# Patient Record
Sex: MALE | Race: WHITE | NOT HISPANIC OR LATINO
[De-identification: names, ages, dates, MRNs, and addresses within clinical notes are randomized per-mention and may not be internally consistent; named-entity substitution may affect disease eponyms.]

---

## 2020-07-10 ENCOUNTER — APPOINTMENT (OUTPATIENT)
Dept: UROLOGY | Facility: CLINIC | Age: 45
End: 2020-07-10
Payer: MEDICAID

## 2020-07-10 VITALS
BODY MASS INDEX: 33.6 KG/M2 | SYSTOLIC BLOOD PRESSURE: 134 MMHG | TEMPERATURE: 97.5 F | HEIGHT: 71 IN | HEART RATE: 75 BPM | WEIGHT: 240 LBS | DIASTOLIC BLOOD PRESSURE: 79 MMHG

## 2020-07-10 DIAGNOSIS — Z80.1 FAMILY HISTORY OF MALIGNANT NEOPLASM OF TRACHEA, BRONCHUS AND LUNG: ICD-10-CM

## 2020-07-10 DIAGNOSIS — Z82.49 FAMILY HISTORY OF ISCHEMIC HEART DISEASE AND OTHER DISEASES OF THE CIRCULATORY SYSTEM: ICD-10-CM

## 2020-07-10 DIAGNOSIS — Z86.39 PERSONAL HISTORY OF OTHER ENDOCRINE, NUTRITIONAL AND METABOLIC DISEASE: ICD-10-CM

## 2020-07-10 DIAGNOSIS — Z72.0 TOBACCO USE: ICD-10-CM

## 2020-07-10 PROCEDURE — 99204 OFFICE O/P NEW MOD 45 MIN: CPT

## 2020-07-10 RX ORDER — NALOXONE HYDROCHLORIDE 0.4 MG/ML
0.4 INJECTION, SOLUTION INTRAMUSCULAR; INTRAVENOUS; SUBCUTANEOUS
Refills: 0 | Status: ACTIVE | COMMUNITY

## 2020-07-10 RX ORDER — CITALOPRAM 10 MG/1
TABLET, FILM COATED ORAL
Refills: 0 | Status: ACTIVE | COMMUNITY

## 2020-07-10 NOTE — REVIEW OF SYSTEMS
[Poor quality erections] : Poor quality erections [No erections] : no erections [Negative] : Heme/Lymph [see HPI] : see HPI

## 2020-07-10 NOTE — HISTORY OF PRESENT ILLNESS
[FreeTextEntry1] : Mr. NGA MILLER 45 year old  M  PMH DM, LE neuropathy  and no PSH. Pt follows with endocrinology for DM. Pt comes in for low testosterone and decreased erections. Last testosterone 6/12/19 55. LH 1.6. PSA 0.2. A1C 8.9. Rates erections 0/10. Small erections in the morning. 1 Son. Possible interest in having more children. Pt has not tried in medicines for erections. Has decreased libido. \par

## 2020-07-10 NOTE — PHYSICAL EXAM
[General Appearance - Well Nourished] : well nourished [General Appearance - Well Developed] : well developed [Well Groomed] : well groomed [Normal Appearance] : normal appearance [General Appearance - In No Acute Distress] : no acute distress [Edema] : no peripheral edema [Respiration, Rhythm And Depth] : normal respiratory rhythm and effort [Abdomen Soft] : soft [Exaggerated Use Of Accessory Muscles For Inspiration] : no accessory muscle use [Costovertebral Angle Tenderness] : no ~M costovertebral angle tenderness [Abdomen Tenderness] : non-tender [Urinary Bladder Findings] : the bladder was normal on palpation [] : no rash [Normal Station and Gait] : the gait and station were normal for the patient's age [No Focal Deficits] : no focal deficits [Oriented To Time, Place, And Person] : oriented to person, place, and time [Affect] : the affect was normal [Mood] : the mood was normal [Not Anxious] : not anxious

## 2020-08-25 ENCOUNTER — APPOINTMENT (OUTPATIENT)
Dept: ENDOCRINOLOGY | Facility: CLINIC | Age: 45
End: 2020-08-25
Payer: MEDICAID

## 2020-08-25 VITALS
DIASTOLIC BLOOD PRESSURE: 90 MMHG | BODY MASS INDEX: 33.6 KG/M2 | SYSTOLIC BLOOD PRESSURE: 130 MMHG | HEIGHT: 71 IN | WEIGHT: 240 LBS

## 2020-08-25 PROCEDURE — 99204 OFFICE O/P NEW MOD 45 MIN: CPT

## 2020-08-25 RX ORDER — LANCETS
EACH MISCELLANEOUS
Qty: 4 | Refills: 0 | Status: ACTIVE | COMMUNITY
Start: 2020-08-25 | End: 1900-01-01

## 2020-08-25 RX ORDER — BLOOD SUGAR DIAGNOSTIC
STRIP MISCELLANEOUS
Qty: 4 | Refills: 0 | Status: ACTIVE | COMMUNITY
Start: 2020-08-25 | End: 1900-01-01

## 2020-08-25 RX ORDER — BLOOD-GLUCOSE METER
W/DEVICE KIT MISCELLANEOUS
Qty: 1 | Refills: 0 | Status: ACTIVE | COMMUNITY
Start: 2020-08-25 | End: 1900-01-01

## 2020-08-25 NOTE — PHYSICAL EXAM
[Alert] : alert [No Acute Distress] : no acute distress [Well Nourished] : well nourished [Normal Hearing] : hearing was normal [Normal Sclera/Conjunctiva] : normal sclera/conjunctiva [Thyroid Not Enlarged] : the thyroid was not enlarged [No Accessory Muscle Use] : no accessory muscle use [Normal S1, S2] : normal S1 and S2 [Clear to Auscultation] : lungs were clear to auscultation bilaterally [Normal Rate] : heart rate was normal [Soft] : abdomen soft [No Edema] : no peripheral edema [Not Tender] : non-tender [No Rash] : no rash [Normal Gait] : normal gait [Oriented x3] : oriented to person, place, and time [de-identified] : slight hand tremor

## 2020-08-25 NOTE — ASSESSMENT
[FreeTextEntry1] : T2DM\par -Long discussion had with patient about severity of disease and its potential complications\par -Begin Victoza 0.6 mg for 7 days and then increase to 1.2 mg daily. He has never injected before so he will need to be taught at nurse apt\par -Begin Jardiance 10 mg daily\par -Continue MFN 1000 mg BID\par -Begin checking BS 2-3x a day, meter sent to pharmacy. Send logs in every 2 weeks\par -Needs CDE apt for diet teach\par -Increase exercise as tolerated, goal of 30 mins a day 5x a day\par -Continue to follow up with all specialists including ophtho, podiatry, neurology \par \par \par HTN\par -Will continue to monitor, likely will need aceI for kidney protection \par \par \par HLD\par -Continue statin \par \par \par Low Testosterone \par -Begin Testosterone injections 100 mg every 2 weeks,will send supplies and rx to pharmacy for patient to bring to [facility] for a nursing injection apt\par \par RTO 3 weeks CDE, 6 weeks with NP

## 2020-08-25 NOTE — REVIEW OF SYSTEMS
[Fatigue] : fatigue [Recent Weight Gain (___ Lbs)] : recent weight gain: [unfilled] lbs [Dysphagia] : dysphagia [Constipation] : constipation [Polyuria] : polyuria [Cold Intolerance] : cold intolerance [Erectile Dysfunction] : erectile dysfunction [Decreased Libido] : decreased libido [Depression] : depression [Anxiety] : anxiety [Visual Field Defect] : no visual field defect [Blurred Vision] : no blurred vision [Neck Pain] : no neck pain [Dysphonia] : no dysphonia [Chest Pain] : no chest pain [Palpitations] : no palpitations [Shortness Of Breath] : no shortness of breath [Nausea] : no nausea [Vomiting] : no vomiting [Headaches] : no headaches [Nocturia] : no nocturia [Diarrhea] : no diarrhea [Tremors] : no tremors [Polydipsia] : no polydipsia [FreeTextEntry3] : wears contacts

## 2020-08-25 NOTE — HISTORY OF PRESENT ILLNESS
[FreeTextEntry1] : New patient presents from [place] as a T2DM with low tesosterone. \par \par T2DM\par Severity: poor control\par Onset: routine \par Duration: approx 4-5 years\par Modifying Factors: never has used insulin before\par \par SMBG\par Does not check BS, does not have meter\par \par HGA1C-10.5% 7/2020\par \par Current drug regimen\par MFN 1000 MG BID\par \par Eye exam: 2020- denies DR\par Foot exam: Saw podiatry- +neuropathy, on gabapentin \par Kidney disease: denies \par Heart disease: denies but FH, father with CAD\par \par Weight: gained 40 lbs in one year\par Diet:\par goes to bed at 4-5 am due to anxiety\par B- glucerna diabetic shake \par L- doesn’t eat lunch\par D- pbj \par S- does not snack, grapes \par Drink- water \par Exercise: goes for walks and does weight training 3x a week \par Smoking: a pack a day smoker \par \par HLD\par On no medication\par FH of high triglycerides\par Total cholesterol 160, Triglycerides 348, HDL 25, LDL 65\par \par HTN\par 130/90\par On no medications\par \par Low Testosterone \par Approx 9 years\par Has an 18 year old son\par Has never been on medication (prescribed or from [facility]) but was a opiod addict since 2009 and now on suboxone (likely the cause of his hypogonadism) \par Had brain MRI 2016 which was negative for pitutiary adenoma \par FH : Dad on androgel and felt very angry \par Low Testosterone 132 \par Free Testosterone 8.1

## 2020-08-28 ENCOUNTER — TRANSCRIPTION ENCOUNTER (OUTPATIENT)
Age: 45
End: 2020-08-28

## 2020-09-16 ENCOUNTER — APPOINTMENT (OUTPATIENT)
Dept: ENDOCRINOLOGY | Facility: CLINIC | Age: 45
End: 2020-09-16
Payer: MEDICAID

## 2020-09-16 LAB — HBA1C MFR BLD HPLC: 10.5

## 2020-09-16 PROCEDURE — G0108 DIAB MANAGE TRN  PER INDIV: CPT

## 2020-09-16 PROCEDURE — 96372 THER/PROPH/DIAG INJ SC/IM: CPT

## 2020-09-16 RX ORDER — TESTOSTERONE CYPIONATE 200 MG/ML
200 INJECTION, SOLUTION INTRAMUSCULAR
Refills: 0 | Status: COMPLETED | OUTPATIENT
Start: 2020-09-16

## 2020-09-16 RX ADMIN — TESTOSTERONE CYPIONATE 0 MG/ML: 200 INJECTION INTRAMUSCULAR at 00:00

## 2020-09-18 ENCOUNTER — TRANSCRIPTION ENCOUNTER (OUTPATIENT)
Age: 45
End: 2020-09-18

## 2020-09-29 ENCOUNTER — TRANSCRIPTION ENCOUNTER (OUTPATIENT)
Age: 45
End: 2020-09-29

## 2020-09-29 ENCOUNTER — APPOINTMENT (OUTPATIENT)
Dept: ENDOCRINOLOGY | Facility: CLINIC | Age: 45
End: 2020-09-29
Payer: MEDICAID

## 2020-09-29 PROCEDURE — 96372 THER/PROPH/DIAG INJ SC/IM: CPT

## 2020-09-29 RX ORDER — TESTOSTERONE CYPIONATE 200 MG/ML
200 INJECTION, SOLUTION INTRAMUSCULAR
Refills: 0 | Status: COMPLETED | OUTPATIENT
Start: 2020-09-29

## 2020-09-29 RX ADMIN — TESTOSTERONE CYPIONATE 0 MG/ML: 200 INJECTION INTRAMUSCULAR at 00:00

## 2020-10-01 ENCOUNTER — APPOINTMENT (OUTPATIENT)
Dept: ENDOCRINOLOGY | Facility: CLINIC | Age: 45
End: 2020-10-01

## 2020-10-07 ENCOUNTER — TRANSCRIPTION ENCOUNTER (OUTPATIENT)
Age: 45
End: 2020-10-07

## 2020-10-12 ENCOUNTER — TRANSCRIPTION ENCOUNTER (OUTPATIENT)
Age: 45
End: 2020-10-12

## 2020-10-14 ENCOUNTER — TRANSCRIPTION ENCOUNTER (OUTPATIENT)
Age: 45
End: 2020-10-14

## 2020-10-15 ENCOUNTER — APPOINTMENT (OUTPATIENT)
Dept: ENDOCRINOLOGY | Facility: CLINIC | Age: 45
End: 2020-10-15
Payer: MEDICAID

## 2020-10-15 VITALS
HEIGHT: 71 IN | DIASTOLIC BLOOD PRESSURE: 70 MMHG | HEART RATE: 72 BPM | WEIGHT: 240 LBS | BODY MASS INDEX: 33.6 KG/M2 | SYSTOLIC BLOOD PRESSURE: 128 MMHG

## 2020-10-15 LAB — GLUCOSE BLDC GLUCOMTR-MCNC: 263

## 2020-10-15 PROCEDURE — 82962 GLUCOSE BLOOD TEST: CPT

## 2020-10-15 PROCEDURE — 99214 OFFICE O/P EST MOD 30 MIN: CPT | Mod: 25

## 2020-10-15 PROCEDURE — 96372 THER/PROPH/DIAG INJ SC/IM: CPT

## 2020-10-15 RX ORDER — TESTOSTERONE CYPIONATE 200 MG/ML
200 INJECTION, SOLUTION INTRAMUSCULAR
Refills: 0 | Status: COMPLETED | OUTPATIENT
Start: 2020-10-15

## 2020-10-15 RX ORDER — PEN NEEDLE, DIABETIC 29 G X1/2"
32G X 4 MM NEEDLE, DISPOSABLE MISCELLANEOUS
Qty: 1 | Refills: 0 | Status: DISCONTINUED | COMMUNITY
Start: 2020-09-16 | End: 2020-10-15

## 2020-10-15 RX ADMIN — TESTOSTERONE CYPIONATE 0 MG/ML: 200 INJECTION INTRAMUSCULAR at 00:00

## 2020-10-15 NOTE — REVIEW OF SYSTEMS
[Fatigue] : fatigue [Visual Field Defect] : visual field defect [Blurred Vision] : blurred vision [Polyuria] : polyuria [Recent Weight Gain (___ Lbs)] : no recent weight gain [Recent Weight Loss (___ Lbs)] : no recent weight loss [Dysphagia] : no dysphagia [Dysphonia] : no dysphonia [Neck Pain] : no neck pain [Palpitations] : no palpitations [Shortness Of Breath] : no shortness of breath [Chest Pain] : no chest pain [Nausea] : no nausea [Constipation] : no constipation [Polydipsia] : no polydipsia [Vomiting] : no vomiting [Diarrhea] : no diarrhea

## 2020-10-15 NOTE — ASSESSMENT
[FreeTextEntry1] : T2DM\par -Long discussion had with patient about severity of disease and its potential complications, he is very ready and willing to make changes\par -Continue Jardiance 10 mg daily\par -Continue MFN 1000 mg BID\par -Begin Januvia 50 mg daily\par -Begin checking BS 2-3x a day, meter sent to pharmacy. Send logs in every 2 weeks\par -Continue CDE apt for diet teach\par -Increase exercise as tolerated, goal of 30 mins a day 5x a day\par -Continue to follow up with all specialists including ophtho, podiatry, neurology \par \par \par HTN\par -Will continue to monitor, likely will need aceI for kidney protection \par \par \par HLD\par -Restart Gemfibrozil \par \par \par Low Testosterone \par -Continue Testosterone injections 100 mg every 2 weeks, needs labs at day 7 of his injection cycle \par -Injection today\par \par \par Labs in 1 week, will call with results\par RTO 1 week CDE, 2 weeks for injection, 6 weeks with NP

## 2020-10-15 NOTE — PHYSICAL EXAM
[Alert] : alert [Well Nourished] : well nourished [Normal Hearing] : hearing was normal [Normal Sclera/Conjunctiva] : normal sclera/conjunctiva [No Acute Distress] : no acute distress [Thyroid Not Enlarged] : the thyroid was not enlarged [No Accessory Muscle Use] : no accessory muscle use [Clear to Auscultation] : lungs were clear to auscultation bilaterally [Normal S1, S2] : normal S1 and S2 [Normal Rate] : heart rate was normal [No Edema] : no peripheral edema [Soft] : abdomen soft [Not Tender] : non-tender [No Rash] : no rash [Oriented x3] : oriented to person, place, and time [Normal Gait] : normal gait [de-identified] : slight hand tremor

## 2020-10-15 NOTE — HISTORY OF PRESENT ILLNESS
[FreeTextEntry1] : Pt starting to feel better, but still not at his goal for mental/physical health\par \par T2DM\par Severity: poor control\par Onset: routine \par Duration: approx 4-5 years\par Modifying Factors: never has used insulin before\par \par SMBG\par Checks FS 3x a day\par Fasting 160-206\par Before lunch 214-278\par At bedtime 181-262\par \par Fs in office 263\par \par HGA1C-10.5% 7/2020\par \par Current drug regimen\par MFN 1000 MG BID\par Jaridance 10 mg daily -just got this approved 2 days ago\par \par Could not tolerate Victoza due to extreme N/V\par \par Eye exam: 2020- denies DR\par Foot exam: Saw podiatry- +severe neuropathy, on gabapentin \par Kidney disease: denies \par Heart disease: denies but FH, father with CAD\par \par Weight: gained 40 lbs in one year\par Diet:\par goes to bed at 4-5 am due to anxiety, stopped binging at night \par B- glucerna diabetic shake \par L- doesn’t eat lunch\par D- pbj \par S- does not snack, grapes \par Drink- water \par Exercise: goes for walks and does weight training 3x a week \par Smoking: a pack a day smoker \par \par HLD\par On no medication\par FH of high triglycerides\par Total cholesterol 160, Triglycerides 348, HDL 25, LDL 65\par \par HTN\par 130/90\par On no medications\par \par Low Testosterone \par Approx 9 years\par Has an 18 year old son\par On 100 mg of testosterone every 2 weeks- recently began\par Had brain MRI 2016 which was negative for pitutiary adenoma \par FH : Dad on androgel and felt very angry \par Low Testosterone 132 \par Free Testosterone 8.1

## 2020-10-16 ENCOUNTER — TRANSCRIPTION ENCOUNTER (OUTPATIENT)
Age: 45
End: 2020-10-16

## 2020-10-19 ENCOUNTER — TRANSCRIPTION ENCOUNTER (OUTPATIENT)
Age: 45
End: 2020-10-19

## 2020-10-20 ENCOUNTER — TRANSCRIPTION ENCOUNTER (OUTPATIENT)
Age: 45
End: 2020-10-20

## 2020-10-20 ENCOUNTER — APPOINTMENT (OUTPATIENT)
Dept: ENDOCRINOLOGY | Facility: CLINIC | Age: 45
End: 2020-10-20

## 2020-10-20 RX ORDER — LINAGLIPTIN 5 MG/1
5 TABLET, FILM COATED ORAL
Qty: 90 | Refills: 1 | Status: DISCONTINUED | COMMUNITY
Start: 2020-10-20 | End: 2020-10-20

## 2020-10-20 RX ORDER — SITAGLIPTIN 50 MG/1
50 TABLET, FILM COATED ORAL
Qty: 90 | Refills: 1 | Status: DISCONTINUED | COMMUNITY
Start: 2020-10-15 | End: 2020-10-20

## 2020-10-26 ENCOUNTER — TRANSCRIPTION ENCOUNTER (OUTPATIENT)
Age: 45
End: 2020-10-26

## 2020-10-28 ENCOUNTER — TRANSCRIPTION ENCOUNTER (OUTPATIENT)
Age: 45
End: 2020-10-28

## 2020-10-30 ENCOUNTER — APPOINTMENT (OUTPATIENT)
Dept: ENDOCRINOLOGY | Facility: CLINIC | Age: 45
End: 2020-10-30
Payer: MEDICAID

## 2020-10-30 PROCEDURE — 96372 THER/PROPH/DIAG INJ SC/IM: CPT

## 2020-10-30 PROCEDURE — 99072 ADDL SUPL MATRL&STAF TM PHE: CPT

## 2020-10-30 RX ORDER — TESTOSTERONE CYPIONATE 200 MG/ML
200 INJECTION, SOLUTION INTRAMUSCULAR
Refills: 0 | Status: COMPLETED | OUTPATIENT
Start: 2020-10-30

## 2020-10-30 RX ADMIN — Medication 0 MG/ML: at 00:00

## 2020-11-02 ENCOUNTER — TRANSCRIPTION ENCOUNTER (OUTPATIENT)
Age: 45
End: 2020-11-02

## 2020-11-03 ENCOUNTER — TRANSCRIPTION ENCOUNTER (OUTPATIENT)
Age: 45
End: 2020-11-03

## 2020-11-04 ENCOUNTER — TRANSCRIPTION ENCOUNTER (OUTPATIENT)
Age: 45
End: 2020-11-04

## 2020-11-06 ENCOUNTER — TRANSCRIPTION ENCOUNTER (OUTPATIENT)
Age: 45
End: 2020-11-06

## 2020-11-09 ENCOUNTER — TRANSCRIPTION ENCOUNTER (OUTPATIENT)
Age: 45
End: 2020-11-09

## 2020-11-13 ENCOUNTER — APPOINTMENT (OUTPATIENT)
Dept: ENDOCRINOLOGY | Facility: CLINIC | Age: 45
End: 2020-11-13
Payer: MEDICAID

## 2020-11-13 PROCEDURE — 96372 THER/PROPH/DIAG INJ SC/IM: CPT

## 2020-11-13 PROCEDURE — 99072 ADDL SUPL MATRL&STAF TM PHE: CPT

## 2020-11-13 RX ORDER — TESTOSTERONE CYPIONATE 200 MG/ML
200 INJECTION, SOLUTION INTRAMUSCULAR
Refills: 0 | Status: COMPLETED | OUTPATIENT
Start: 2020-11-13

## 2020-11-13 RX ADMIN — TESTOSTERONE CYPIONATE 0 MG/ML: 200 INJECTION INTRAMUSCULAR at 00:00

## 2020-11-16 ENCOUNTER — TRANSCRIPTION ENCOUNTER (OUTPATIENT)
Age: 45
End: 2020-11-16

## 2020-11-19 LAB
HBA1C MFR BLD HPLC: 9.7
LDLC SERPL DIRECT ASSAY-MCNC: 101
MICROALBUMIN/CREAT 24H UR-RTO: <4

## 2020-11-20 ENCOUNTER — APPOINTMENT (OUTPATIENT)
Dept: ENDOCRINOLOGY | Facility: CLINIC | Age: 45
End: 2020-11-20
Payer: MEDICAID

## 2020-11-20 VITALS
HEIGHT: 71 IN | SYSTOLIC BLOOD PRESSURE: 132 MMHG | DIASTOLIC BLOOD PRESSURE: 84 MMHG | HEART RATE: 98 BPM | BODY MASS INDEX: 32.9 KG/M2 | WEIGHT: 235 LBS

## 2020-11-20 DIAGNOSIS — Z87.440 PERSONAL HISTORY OF URINARY (TRACT) INFECTIONS: ICD-10-CM

## 2020-11-20 PROCEDURE — 99214 OFFICE O/P EST MOD 30 MIN: CPT

## 2020-11-20 RX ORDER — LIRAGLUTIDE 6 MG/ML
18 INJECTION SUBCUTANEOUS
Qty: 3 | Refills: 0 | Status: DISCONTINUED | COMMUNITY
Start: 2020-08-25 | End: 2020-11-20

## 2020-11-20 NOTE — REVIEW OF SYSTEMS
[Fatigue] : fatigue [Recent Weight Loss (___ Lbs)] : recent weight loss: [unfilled] lbs [Diarrhea] : diarrhea [Polyuria] : polyuria [Hesistancy] : hesitancy [Depression] : depression [Anxiety] : anxiety [Polydipsia] : polydipsia [Visual Field Defect] : no visual field defect [Blurred Vision] : no blurred vision [Dysphagia] : no dysphagia [Neck Pain] : no neck pain [Dysphonia] : no dysphonia [Chest Pain] : no chest pain [Palpitations] : no palpitations [Shortness Of Breath] : no shortness of breath [Nausea] : no nausea [Constipation] : no constipation [Vomiting] : no vomiting [FreeTextEntry2] : not sleeping well  [FreeTextEntry7] : soft stool [de-identified] : at night/dry mouth

## 2020-11-20 NOTE — ASSESSMENT
[FreeTextEntry1] : Pt complains of urinary hesistancy. Urinalysis drawn in office today, unlikely a UTI but will rule out and reconsider Jardiance if so. \par \par T2DM\par -Long discussion had with patient about severity of disease and its potential complications, he is very ready and willing to make changes\par -Continue Jardiance 10 mg daily\par -Continue MFN 1000 mg BID\par -Continue Alogliptin 25 mgdaily\par -Continue checking BS 2-3x a day, meter sent to pharmacy. Send logs in every 2 weeks. Improvement noted\par -Continue CDE apt for diet teach\par -Increase exercise as tolerated, goal of 30 mins a day 5x a day\par -Continue to follow up with all specialists including ophtho, podiatry, neurology \par \par \par HTN\par -Will continue to monitor, likely will need aceI for kidney protection \par \par \par HLD\par -Continue Gemfibrozil \par \par \par Low Testosterone \par -Continue Testosterone injections 100 mg every 2 weeks\par \par \par Pt very depressed and has calls out to different mental health consuelors. If he maintains on a waiting list I will call to advocate to Sam Connell  at [email or web address] [facility] [phone]\par \par \par RTO 6 weeks with NP

## 2020-11-20 NOTE — PHYSICAL EXAM

## 2020-11-20 NOTE — HISTORY OF PRESENT ILLNESS
[FreeTextEntry1] : Pt starting to feel better, but still not at his goal for mental/physical health\par \par T2DM\par Severity: poor control\par Onset: routine \par Duration: approx 4-5 years\par Modifying Factors: never has used insulin before\par \par SMBG\par Checks FS 3x a day\par Fasting 104-134\par Before lunch 170-263\par At bedtime 132-210\par \par Fs in office 223- had cereal \par \par ONI9W-0.7% 10/2020\par \par Current drug regimen\par MFN 1000 MG BID\par Jaridance 10 mg daily -just got this approved 2 days ago\par Alogliptin 25 mg daily\par \par Could not tolerate Victoza due to extreme N/V\par \par Eye exam: 2020- denies DR\par Foot exam: Saw podiatry- +severe neuropathy, on gabapentin \par Kidney disease: denies \par Heart disease: denies but FH, father with CAD\par \par Weight: 5 lb weight loss \par Diet:\par goes to bed at 4-5 am due to anxiety, stopped binging at night \par B- glucerna diabetic shake \par L- doesn’t eat lunch\par D- pbj \par S- does not snack, grapes \par Drink- water \par Exercise: goes for walks and does weight training 3x a week \par Smoking: a pack a day smoker \par \par HLD\par Gemfibrizol 600 mg daily-just restarted\par FH of high triglycerides\par Total cholesterol 160, Triglycerides 348, HDL 25, LDL 65\par \par HTN\par 132/84\par On no medications\par \par Low Testosterone \par Approx 9 years\par Has an 18 year old son\par On 100 mg of testosterone every 2 weeks- recently began\par Had brain MRI 2016 which was negative for pitutiary adenoma \par FH : Dad on androgel and felt very angry \par Low Testosterone 132 \par Free Testosterone 8.1

## 2020-11-23 ENCOUNTER — TRANSCRIPTION ENCOUNTER (OUTPATIENT)
Age: 45
End: 2020-11-23

## 2020-11-23 LAB
APPEARANCE: CLEAR
BACTERIA UR CULT: NORMAL
BACTERIA: NEGATIVE
BILIRUBIN URINE: NEGATIVE
BLOOD URINE: NEGATIVE
COLOR: NORMAL
GLUCOSE QUALITATIVE U: ABNORMAL
HYALINE CASTS: 0 /LPF
KETONES URINE: NEGATIVE
LEUKOCYTE ESTERASE URINE: NEGATIVE
MICROSCOPIC-UA: NORMAL
NITRITE URINE: NEGATIVE
PH URINE: 5.5
PROTEIN URINE: NEGATIVE
RED BLOOD CELLS URINE: 1 /HPF
SPECIFIC GRAVITY URINE: 1.03
SQUAMOUS EPITHELIAL CELLS: 0 /HPF
UROBILINOGEN URINE: NORMAL
WHITE BLOOD CELLS URINE: 0 /HPF

## 2020-11-30 ENCOUNTER — TRANSCRIPTION ENCOUNTER (OUTPATIENT)
Age: 45
End: 2020-11-30

## 2020-11-30 ENCOUNTER — APPOINTMENT (OUTPATIENT)
Dept: ENDOCRINOLOGY | Facility: CLINIC | Age: 45
End: 2020-11-30
Payer: MEDICAID

## 2020-11-30 PROCEDURE — 99072 ADDL SUPL MATRL&STAF TM PHE: CPT

## 2020-11-30 PROCEDURE — 96372 THER/PROPH/DIAG INJ SC/IM: CPT

## 2020-11-30 RX ORDER — TESTOSTERONE CYPIONATE 200 MG/ML
200 INJECTION, SOLUTION INTRAMUSCULAR
Refills: 0 | Status: COMPLETED | OUTPATIENT
Start: 2020-11-30

## 2020-11-30 RX ADMIN — TESTOSTERONE CYPIONATE 0 MG/ML: 200 INJECTION INTRAMUSCULAR at 00:00

## 2020-12-01 ENCOUNTER — TRANSCRIPTION ENCOUNTER (OUTPATIENT)
Age: 45
End: 2020-12-01

## 2020-12-08 ENCOUNTER — TRANSCRIPTION ENCOUNTER (OUTPATIENT)
Age: 45
End: 2020-12-08

## 2020-12-14 ENCOUNTER — APPOINTMENT (OUTPATIENT)
Dept: ENDOCRINOLOGY | Facility: CLINIC | Age: 45
End: 2020-12-14
Payer: MEDICAID

## 2020-12-14 PROCEDURE — 99072 ADDL SUPL MATRL&STAF TM PHE: CPT

## 2020-12-14 PROCEDURE — 96372 THER/PROPH/DIAG INJ SC/IM: CPT

## 2020-12-14 RX ORDER — TESTOSTERONE CYPIONATE 200 MG/ML
200 INJECTION, SOLUTION INTRAMUSCULAR
Refills: 0 | Status: COMPLETED | OUTPATIENT
Start: 2020-12-14

## 2020-12-14 RX ADMIN — TESTOSTERONE CYPIONATE 0 MG/ML: 200 INJECTION INTRAMUSCULAR at 00:00

## 2020-12-21 ENCOUNTER — APPOINTMENT (OUTPATIENT)
Dept: ENDOCRINOLOGY | Facility: CLINIC | Age: 45
End: 2020-12-21
Payer: MEDICAID

## 2020-12-21 PROCEDURE — 96372 THER/PROPH/DIAG INJ SC/IM: CPT

## 2020-12-21 PROCEDURE — 99072 ADDL SUPL MATRL&STAF TM PHE: CPT

## 2020-12-21 RX ORDER — TESTOSTERONE CYPIONATE 200 MG/ML
200 INJECTION, SOLUTION INTRAMUSCULAR
Refills: 0 | Status: COMPLETED | OUTPATIENT
Start: 2020-12-21

## 2020-12-21 RX ADMIN — TESTOSTERONE CYPIONATE 0 MG/ML: 200 INJECTION INTRAMUSCULAR at 00:00

## 2020-12-23 PROBLEM — Z87.440 HISTORY OF URINARY TRACT INFECTION: Status: RESOLVED | Noted: 2020-11-16 | Resolved: 2020-12-23

## 2020-12-28 ENCOUNTER — APPOINTMENT (OUTPATIENT)
Dept: ENDOCRINOLOGY | Facility: CLINIC | Age: 45
End: 2020-12-28
Payer: MEDICAID

## 2020-12-28 PROCEDURE — 99072 ADDL SUPL MATRL&STAF TM PHE: CPT

## 2020-12-28 PROCEDURE — 96372 THER/PROPH/DIAG INJ SC/IM: CPT

## 2020-12-28 RX ORDER — TESTOSTERONE CYPIONATE 200 MG/ML
200 INJECTION, SOLUTION INTRAMUSCULAR
Refills: 0 | Status: COMPLETED | OUTPATIENT
Start: 2020-12-28

## 2020-12-28 RX ADMIN — Medication 0 MG/ML: at 00:00

## 2021-01-04 ENCOUNTER — APPOINTMENT (OUTPATIENT)
Dept: ENDOCRINOLOGY | Facility: CLINIC | Age: 46
End: 2021-01-04

## 2021-01-07 ENCOUNTER — APPOINTMENT (OUTPATIENT)
Dept: ENDOCRINOLOGY | Facility: CLINIC | Age: 46
End: 2021-01-07
Payer: MEDICAID

## 2021-01-07 ENCOUNTER — RESULT CHARGE (OUTPATIENT)
Age: 46
End: 2021-01-07

## 2021-01-07 VITALS
DIASTOLIC BLOOD PRESSURE: 88 MMHG | HEART RATE: 85 BPM | WEIGHT: 232 LBS | SYSTOLIC BLOOD PRESSURE: 142 MMHG | HEIGHT: 71 IN | BODY MASS INDEX: 32.48 KG/M2

## 2021-01-07 LAB — GLUCOSE BLDC GLUCOMTR-MCNC: 171

## 2021-01-07 PROCEDURE — 99214 OFFICE O/P EST MOD 30 MIN: CPT | Mod: 25

## 2021-01-07 PROCEDURE — 82962 GLUCOSE BLOOD TEST: CPT

## 2021-01-07 PROCEDURE — 96372 THER/PROPH/DIAG INJ SC/IM: CPT

## 2021-01-07 PROCEDURE — 99072 ADDL SUPL MATRL&STAF TM PHE: CPT

## 2021-01-07 RX ORDER — TESTOSTERONE CYPIONATE 200 MG/ML
200 INJECTION, SOLUTION INTRAMUSCULAR
Refills: 0 | Status: COMPLETED | OUTPATIENT
Start: 2021-01-07

## 2021-01-07 RX ADMIN — TESTOSTERONE CYPIONATE 0 MG/ML: 200 INJECTION INTRAMUSCULAR at 00:00

## 2021-01-07 NOTE — REVIEW OF SYSTEMS
[Fatigue] : fatigue [Decreased Appetite] : decreased appetite [Recent Weight Loss (___ Lbs)] : recent weight loss: [unfilled] lbs [Visual Field Defect] : visual field defect [Blurred Vision] : blurred vision [Dysuria] : dysuria [Hesistancy] : hesitancy [Polydipsia] : polydipsia [Recent Weight Gain (___ Lbs)] : no recent weight gain [Dysphagia] : no dysphagia [Neck Pain] : no neck pain [Dysphonia] : no dysphonia [Chest Pain] : no chest pain [Shortness Of Breath] : no shortness of breath [Constipation] : no constipation [Diarrhea] : no diarrhea [Headaches] : no headaches [FreeTextEntry3] : at times [FreeTextEntry9] : +extreme neuropathy  [de-identified] : at night

## 2021-01-07 NOTE — HISTORY OF PRESENT ILLNESS
[FreeTextEntry1] : Pt starting to feel better, but still not at his goal for mental/physical health\par \par T2DM\par Severity: poor control\par Onset: routine \par Duration: approx 4-5 years\par Modifying Factors: never has used insulin before\par \par SMBG\par Checks FS 3x a day\par On average fasting 100-200\par Before lunch high 100s, low 200s\par Before dinner high 100s, low 200s\par \par Fs in office 171-two eggs \par \par Current drug regimen\par MFN 1000 MG BID\par Jaridance 25 mg daily\par Alogliptin 25 mg daily\par \par Could not tolerate Victoza due to extreme N/V\par \par Eye exam: 2020- denies DR\par Foot exam: Saw podiatry- +severe neuropathy, on gabapentin \par Kidney disease: denies \par Heart disease: denies but FH, father with CAD\par \par Weight: 8 lb weight loss \par Diet:\par goes to bed at 4-5 am due to anxiety, stopped binging at night \par B- glucerna diabetic shake \par L- doesn’t eat lunch\par D- pbj \par S- does not snack, grapes \par Drink- water \par Exercise: goes for walks and does weight training 3x a week \par Smoking: a pack a day smoker \par \par HLD\par Gemfibrizol 600 mg daily-just restarted\par FH of high triglycerides\par Need updated lipid panel\par \par HTN\par 142/88\par On no medications\par \par Low Testosterone \par Approx 9 years\par Has an 18 year old son\par On80 mg of testosterone every week\par Had brain MRI 2016 which was negative for pitutiary adenoma \par FH : Dad on androgel and felt very angry \par \par Low Vitamin D\par 50,000 IU supplement weekly\par

## 2021-01-07 NOTE — PHYSICAL EXAM

## 2021-01-07 NOTE — ASSESSMENT
[FreeTextEntry1] : T2DM\par -Improvement noted but blood sugars not at goal\par -Begin Glimepriride 2 mg daily \par -Continue Jardiance 25 mg daily\par -Continue MFN 1000 mg BID\par -Continue Alogliptin 25 mg daily\par -Continue checking BS 2-3x a day, meter sent to pharmacy. Send logs in every 2 weeks. Improvement noted\par -Continue CDE apt for diet teach\par -Increase exercise as tolerated, goal of 30 mins a day 5x a day\par -Continue to follow up with all specialists including ophtho, podiatry, neurology \par \par \par HTN\par -Will continue to monitor, likely will need aceI for kidney protection \par \par \par HLD\par -Continue Gemfibrozil \par \par \par Low Testosterone \par -Continue Testosterone injections 80 mg every 1 week\par \par \par Pt very depressed and has calls out to different mental health consuelors. If he maintains on a waiting list I will call [phone]\par \par Will advocate to network care for a neurology apt.\par \par Fasting labs needed\par RTO 3 months

## 2021-01-08 ENCOUNTER — TRANSCRIPTION ENCOUNTER (OUTPATIENT)
Age: 46
End: 2021-01-08

## 2021-01-11 ENCOUNTER — TRANSCRIPTION ENCOUNTER (OUTPATIENT)
Age: 46
End: 2021-01-11

## 2021-01-11 ENCOUNTER — APPOINTMENT (OUTPATIENT)
Dept: UROLOGY | Facility: CLINIC | Age: 46
End: 2021-01-11
Payer: MEDICAID

## 2021-01-11 VITALS
HEART RATE: 75 BPM | BODY MASS INDEX: 32.48 KG/M2 | WEIGHT: 232 LBS | SYSTOLIC BLOOD PRESSURE: 138 MMHG | HEIGHT: 71 IN | DIASTOLIC BLOOD PRESSURE: 85 MMHG | TEMPERATURE: 97.2 F

## 2021-01-11 LAB
BILIRUB UR QL STRIP: NEGATIVE
CLARITY UR: CLEAR
COLLECTION METHOD: NORMAL
GLUCOSE UR-MCNC: 1000
HCG UR QL: 0.2 EU/DL
HGB UR QL STRIP.AUTO: NORMAL
KETONES UR-MCNC: NEGATIVE
LEUKOCYTE ESTERASE UR QL STRIP: NEGATIVE
NITRITE UR QL STRIP: NEGATIVE
PH UR STRIP: 5
PROT UR STRIP-MCNC: NEGATIVE
SP GR UR STRIP: 1.02

## 2021-01-11 PROCEDURE — 99214 OFFICE O/P EST MOD 30 MIN: CPT | Mod: 25

## 2021-01-11 PROCEDURE — 99072 ADDL SUPL MATRL&STAF TM PHE: CPT

## 2021-01-11 PROCEDURE — 51798 US URINE CAPACITY MEASURE: CPT

## 2021-01-11 PROCEDURE — 81003 URINALYSIS AUTO W/O SCOPE: CPT | Mod: QW

## 2021-01-11 RX ORDER — TAMSULOSIN HYDROCHLORIDE 0.4 MG/1
0.4 CAPSULE ORAL
Qty: 30 | Refills: 1 | Status: ACTIVE | COMMUNITY
Start: 2021-01-11 | End: 1900-01-01

## 2021-01-11 NOTE — ASSESSMENT
[FreeTextEntry1] : Plan\par UA\par culture\par start flomax\par pt to discuss with Endocrinology to stop/decrease jardiance\par fu 1 month

## 2021-01-11 NOTE — HISTORY OF PRESENT ILLNESS
[FreeTextEntry1] : Pt comes in complaining of hesitancy and intermittency mainly in the beginning of urination. This all started about 6 weeks ago around the same time his endocrinologist increased his  Jardiance. \par \par PVR 5

## 2021-01-12 LAB
APPEARANCE: CLEAR
BACTERIA: NEGATIVE
BILIRUBIN URINE: NEGATIVE
BLOOD URINE: NEGATIVE
CALCIUM OXALATE CRYSTALS: ABNORMAL
COLOR: NORMAL
GLUCOSE QUALITATIVE U: ABNORMAL
HYALINE CASTS: 0 /LPF
KETONES URINE: NEGATIVE
LEUKOCYTE ESTERASE URINE: NEGATIVE
MICROSCOPIC-UA: NORMAL
NITRITE URINE: NEGATIVE
PH URINE: 5.5
PROTEIN URINE: NEGATIVE
RED BLOOD CELLS URINE: 0 /HPF
SPECIFIC GRAVITY URINE: 1.03
SQUAMOUS EPITHELIAL CELLS: 0 /HPF
UROBILINOGEN URINE: NORMAL
WHITE BLOOD CELLS URINE: 0 /HPF

## 2021-01-13 LAB — BACTERIA UR CULT: NORMAL

## 2021-01-14 ENCOUNTER — APPOINTMENT (OUTPATIENT)
Dept: ENDOCRINOLOGY | Facility: CLINIC | Age: 46
End: 2021-01-14
Payer: MEDICAID

## 2021-01-14 PROCEDURE — 96372 THER/PROPH/DIAG INJ SC/IM: CPT

## 2021-01-14 PROCEDURE — 99072 ADDL SUPL MATRL&STAF TM PHE: CPT

## 2021-01-14 RX ORDER — TESTOSTERONE CYPIONATE 200 MG/ML
200 INJECTION, SOLUTION INTRAMUSCULAR
Refills: 0 | Status: COMPLETED | OUTPATIENT
Start: 2021-01-14

## 2021-01-14 RX ADMIN — TESTOSTERONE CYPIONATE 0 MG/ML: 200 INJECTION INTRAMUSCULAR at 00:00

## 2021-01-20 ENCOUNTER — TRANSCRIPTION ENCOUNTER (OUTPATIENT)
Age: 46
End: 2021-01-20

## 2021-01-21 ENCOUNTER — TRANSCRIPTION ENCOUNTER (OUTPATIENT)
Age: 46
End: 2021-01-21

## 2021-01-21 ENCOUNTER — APPOINTMENT (OUTPATIENT)
Dept: ENDOCRINOLOGY | Facility: CLINIC | Age: 46
End: 2021-01-21
Payer: MEDICAID

## 2021-01-21 PROCEDURE — 96372 THER/PROPH/DIAG INJ SC/IM: CPT

## 2021-01-21 PROCEDURE — 99072 ADDL SUPL MATRL&STAF TM PHE: CPT

## 2021-01-21 RX ORDER — TESTOSTERONE CYPIONATE 200 MG/ML
200 INJECTION, SOLUTION INTRAMUSCULAR
Refills: 0 | Status: COMPLETED | OUTPATIENT
Start: 2021-01-21

## 2021-01-21 RX ADMIN — TESTOSTERONE CYPIONATE 0 MG/ML: 200 INJECTION INTRAMUSCULAR at 00:00

## 2021-01-25 ENCOUNTER — TRANSCRIPTION ENCOUNTER (OUTPATIENT)
Age: 46
End: 2021-01-25

## 2021-01-28 ENCOUNTER — APPOINTMENT (OUTPATIENT)
Dept: ENDOCRINOLOGY | Facility: CLINIC | Age: 46
End: 2021-01-28
Payer: MEDICAID

## 2021-01-28 PROCEDURE — 96372 THER/PROPH/DIAG INJ SC/IM: CPT

## 2021-01-28 PROCEDURE — 99072 ADDL SUPL MATRL&STAF TM PHE: CPT

## 2021-01-28 RX ORDER — TESTOSTERONE CYPIONATE 200 MG/ML
200 INJECTION, SOLUTION INTRAMUSCULAR
Refills: 0 | Status: COMPLETED | OUTPATIENT
Start: 2021-01-28

## 2021-01-28 RX ADMIN — TESTOSTERONE CYPIONATE 0 MG/ML: 200 INJECTION INTRAMUSCULAR at 00:00

## 2021-02-08 ENCOUNTER — APPOINTMENT (OUTPATIENT)
Dept: ENDOCRINOLOGY | Facility: CLINIC | Age: 46
End: 2021-02-08
Payer: MEDICAID

## 2021-02-08 ENCOUNTER — APPOINTMENT (OUTPATIENT)
Dept: UROLOGY | Facility: CLINIC | Age: 46
End: 2021-02-08

## 2021-02-08 PROCEDURE — 99072 ADDL SUPL MATRL&STAF TM PHE: CPT

## 2021-02-08 PROCEDURE — 96372 THER/PROPH/DIAG INJ SC/IM: CPT

## 2021-02-08 RX ORDER — TESTOSTERONE CYPIONATE 200 MG/ML
200 INJECTION, SOLUTION INTRAMUSCULAR
Refills: 0 | Status: COMPLETED | OUTPATIENT
Start: 2021-02-08

## 2021-02-08 RX ADMIN — TESTOSTERONE CYPIONATE 0 MG/ML: 200 INJECTION INTRAMUSCULAR at 00:00

## 2021-02-12 ENCOUNTER — TRANSCRIPTION ENCOUNTER (OUTPATIENT)
Age: 46
End: 2021-02-12

## 2021-02-15 ENCOUNTER — TRANSCRIPTION ENCOUNTER (OUTPATIENT)
Age: 46
End: 2021-02-15

## 2021-02-15 ENCOUNTER — APPOINTMENT (OUTPATIENT)
Dept: ENDOCRINOLOGY | Facility: CLINIC | Age: 46
End: 2021-02-15
Payer: MEDICAID

## 2021-02-15 PROCEDURE — 99072 ADDL SUPL MATRL&STAF TM PHE: CPT

## 2021-02-15 PROCEDURE — 96372 THER/PROPH/DIAG INJ SC/IM: CPT

## 2021-02-15 RX ORDER — TESTOSTERONE CYPIONATE 200 MG/ML
200 INJECTION, SOLUTION INTRAMUSCULAR
Refills: 0 | Status: COMPLETED | OUTPATIENT
Start: 2021-02-15

## 2021-02-15 RX ADMIN — TESTOSTERONE CYPIONATE 0 MG/ML: 200 INJECTION INTRAMUSCULAR at 00:00

## 2021-02-16 ENCOUNTER — TRANSCRIPTION ENCOUNTER (OUTPATIENT)
Age: 46
End: 2021-02-16

## 2021-02-22 ENCOUNTER — APPOINTMENT (OUTPATIENT)
Dept: ENDOCRINOLOGY | Facility: CLINIC | Age: 46
End: 2021-02-22
Payer: MEDICAID

## 2021-02-22 PROCEDURE — 99072 ADDL SUPL MATRL&STAF TM PHE: CPT

## 2021-02-22 PROCEDURE — 96372 THER/PROPH/DIAG INJ SC/IM: CPT

## 2021-02-22 RX ORDER — TESTOSTERONE CYPIONATE 200 MG/ML
200 INJECTION, SOLUTION INTRAMUSCULAR
Refills: 0 | Status: COMPLETED | OUTPATIENT
Start: 2021-02-22

## 2021-02-22 RX ADMIN — TESTOSTERONE CYPIONATE 0 MG/ML: 200 INJECTION INTRAMUSCULAR at 00:00

## 2021-02-26 ENCOUNTER — TRANSCRIPTION ENCOUNTER (OUTPATIENT)
Age: 46
End: 2021-02-26

## 2021-02-26 RX ORDER — SILDENAFIL 50 MG/1
50 TABLET ORAL
Qty: 10 | Refills: 0 | Status: ACTIVE | COMMUNITY
Start: 2021-02-26 | End: 1900-01-01

## 2021-03-01 ENCOUNTER — APPOINTMENT (OUTPATIENT)
Dept: ENDOCRINOLOGY | Facility: CLINIC | Age: 46
End: 2021-03-01
Payer: MEDICAID

## 2021-03-01 PROCEDURE — 96372 THER/PROPH/DIAG INJ SC/IM: CPT

## 2021-03-01 PROCEDURE — 99072 ADDL SUPL MATRL&STAF TM PHE: CPT

## 2021-03-01 RX ORDER — TESTOSTERONE CYPIONATE 200 MG/ML
200 INJECTION, SOLUTION INTRAMUSCULAR
Refills: 0 | Status: COMPLETED | OUTPATIENT
Start: 2021-03-01

## 2021-03-01 RX ADMIN — TESTOSTERONE CYPIONATE 0 MG/ML: 200 INJECTION INTRAMUSCULAR at 00:00

## 2021-03-08 ENCOUNTER — APPOINTMENT (OUTPATIENT)
Dept: ENDOCRINOLOGY | Facility: CLINIC | Age: 46
End: 2021-03-08
Payer: MEDICAID

## 2021-03-08 PROCEDURE — 99072 ADDL SUPL MATRL&STAF TM PHE: CPT

## 2021-03-08 PROCEDURE — 96372 THER/PROPH/DIAG INJ SC/IM: CPT

## 2021-03-08 RX ORDER — TESTOSTERONE CYPIONATE 200 MG/ML
200 INJECTION, SOLUTION INTRAMUSCULAR
Refills: 0 | Status: COMPLETED | OUTPATIENT
Start: 2021-03-08

## 2021-03-08 RX ADMIN — TESTOSTERONE CYPIONATE 0 MG/ML: 200 INJECTION INTRAMUSCULAR at 00:00

## 2021-03-15 ENCOUNTER — APPOINTMENT (OUTPATIENT)
Dept: ENDOCRINOLOGY | Facility: CLINIC | Age: 46
End: 2021-03-15
Payer: MEDICAID

## 2021-03-15 PROCEDURE — 99072 ADDL SUPL MATRL&STAF TM PHE: CPT

## 2021-03-15 PROCEDURE — 96372 THER/PROPH/DIAG INJ SC/IM: CPT

## 2021-03-15 RX ORDER — TESTOSTERONE CYPIONATE 200 MG/ML
200 INJECTION, SOLUTION INTRAMUSCULAR
Refills: 0 | Status: COMPLETED | OUTPATIENT
Start: 2021-03-15

## 2021-03-15 RX ADMIN — TESTOSTERONE CYPIONATE 0 MG/ML: 200 INJECTION INTRAMUSCULAR at 00:00

## 2021-03-22 ENCOUNTER — APPOINTMENT (OUTPATIENT)
Dept: ENDOCRINOLOGY | Facility: CLINIC | Age: 46
End: 2021-03-22
Payer: MEDICAID

## 2021-03-22 PROCEDURE — 96372 THER/PROPH/DIAG INJ SC/IM: CPT

## 2021-03-22 PROCEDURE — 99072 ADDL SUPL MATRL&STAF TM PHE: CPT

## 2021-03-22 RX ORDER — TESTOSTERONE CYPIONATE 200 MG/ML
200 INJECTION, SOLUTION INTRAMUSCULAR
Refills: 0 | Status: COMPLETED | OUTPATIENT
Start: 2021-03-22

## 2021-03-22 RX ADMIN — TESTOSTERONE CYPIONATE 0 MG/ML: 200 INJECTION INTRAMUSCULAR at 00:00

## 2021-04-01 ENCOUNTER — APPOINTMENT (OUTPATIENT)
Dept: ENDOCRINOLOGY | Facility: CLINIC | Age: 46
End: 2021-04-01

## 2021-04-01 ENCOUNTER — APPOINTMENT (OUTPATIENT)
Dept: ENDOCRINOLOGY | Facility: CLINIC | Age: 46
End: 2021-04-01
Payer: MEDICAID

## 2021-04-01 PROCEDURE — 99072 ADDL SUPL MATRL&STAF TM PHE: CPT

## 2021-04-01 PROCEDURE — 96372 THER/PROPH/DIAG INJ SC/IM: CPT

## 2021-04-01 RX ORDER — TESTOSTERONE CYPIONATE 200 MG/ML
200 INJECTION, SOLUTION INTRAMUSCULAR
Refills: 0 | Status: COMPLETED | OUTPATIENT
Start: 2021-04-01

## 2021-04-01 RX ADMIN — TESTOSTERONE CYPIONATE 0 MG/ML: 200 INJECTION INTRAMUSCULAR at 00:00

## 2021-04-05 ENCOUNTER — APPOINTMENT (OUTPATIENT)
Dept: ENDOCRINOLOGY | Facility: CLINIC | Age: 46
End: 2021-04-05

## 2021-04-05 ENCOUNTER — TRANSCRIPTION ENCOUNTER (OUTPATIENT)
Age: 46
End: 2021-04-05

## 2021-04-07 LAB
HBA1C MFR BLD HPLC: 7.5
LDLC SERPL DIRECT ASSAY-MCNC: 105
MICROALBUMIN/CREAT 24H UR-RTO: 5

## 2021-04-08 ENCOUNTER — APPOINTMENT (OUTPATIENT)
Dept: ENDOCRINOLOGY | Facility: CLINIC | Age: 46
End: 2021-04-08
Payer: MEDICAID

## 2021-04-08 PROCEDURE — 99072 ADDL SUPL MATRL&STAF TM PHE: CPT

## 2021-04-08 PROCEDURE — 96372 THER/PROPH/DIAG INJ SC/IM: CPT

## 2021-04-08 RX ORDER — TESTOSTERONE CYPIONATE 200 MG/ML
200 INJECTION, SOLUTION INTRAMUSCULAR
Refills: 0 | Status: COMPLETED | OUTPATIENT
Start: 2021-04-08

## 2021-04-08 RX ADMIN — TESTOSTERONE CYPIONATE 0 MG/ML: 200 INJECTION INTRAMUSCULAR at 00:00

## 2021-04-08 NOTE — PHYSICAL EXAM

## 2021-04-08 NOTE — ASSESSMENT
SUBJECTIVE:   Raúl Hunt is a 2 month old male, here for a routine health maintenance visit,   accompanied by his mother, brother and .    Patient was roomed by: DRE Phelps  Do you have any forms to be completed?  no    BIRTH HISTORY   metabolic screening: All components normal    SOCIAL HISTORY  Child lives with: mother and siblings   Who takes care of your infant: family members - mom returned to work 2 weeks ago.   Language(s) spoken at home: [place]    SAFETY/HEALTH RISK  Is your child around anyone who smokes: mother states no but family smokes outside   TB exposure:  No  Is your car seat less than 6 years old, in the back seat, rear-facing, 5-point restraint:  Yes    DAILY ACTIVITIES  WATER SOURCE:  bottled water with fluoride    NUTRITION: Formula: Similac Advance   Tolerating well, no significant spitting up.    Takes 4oz per bottle, approx q2hrs.     SLEEP  Arrangements:    bassinet  Problems    none    ELIMINATION  Stools:    normal soft stools  Urination:    normal wet diapers    HEARING/VISION: no concerns, hearing and vision subjectively normal.    QUESTIONS/CONCERNS: None    ==================    DEVELOPMENT  Screening tool used, reviewed with parent/guardian:   ASQ 2 M Communication Gross Motor Fine Motor Problem Solving Personal-social   Score 50 50 50 50 60   Cutoff 22.70 41.84 30.16 24.62 33.17   Result Passed Passed Passed Passed Passed       PROBLEM LIST  Patient Active Problem List   Diagnosis     [place] exposure to maternal hepatitis B     MEDICATIONS  No current outpatient prescriptions on file.      ALLERGY  No Known Allergies    IMMUNIZATIONS  Immunization History   Administered Date(s) Administered     DTAP-IPV/HIB (PENTACEL) 2018     Hep B, Peds or Adolescent 2017, 2018     Pneumo Conj 13-V (2010&after) 2018     Rotavirus, monovalent, 2-dose 2018       HEALTH HISTORY SINCE LAST VISIT  No surgery, major illness or injury since last  [FreeTextEntry1] : T2DM\par -Improvement noted but blood sugars not at goal\par -Begin Glimepriride 2 mg daily \par -Continue Jardiance 25 mg daily\par -Continue MFN 1000 mg BID\par -Continue Alogliptin 25 mg daily\par -Continue checking BS 2-3x a day, meter sent to pharmacy. Send logs in every 2 weeks. Improvement noted\par -Continue CDE apt for diet teach\par -Increase exercise as tolerated, goal of 30 mins a day 5x a day\par -Continue to follow up with all specialists including ophtho, podiatry, neurology \par \par \par HTN\par -Will continue to monitor, likely will need aceI for kidney protection \par \par \par HLD\par -Continue Gemfibrozil \par \par \par Low Testosterone \par -Continue Testosterone injections 80 mg every 1 week\par \par \par Pt very depressed and has calls out to different mental health consuelors. If he maintains on a waiting list I will call to advocate [facility] [phone]\par \par Will advocate to network care for a neurology apt.\par \par Fasting labs needed\par RTO 3 months "physical exam    ROS  GENERAL: See health history, nutrition and daily activities   SKIN:  No  significant rash or lesions.  HEENT: Hearing/vision: see above.  No eye, nasal, ear concerns  RESP: No cough or other concerns  CV: No concerns  GI: See nutrition and elimination. No concerns.  : See elimination. No concerns  NEURO: See development    OBJECTIVE:   EXAM  Pulse 140  Temp 97.7  F (36.5  C) (Axillary)  Ht 1' 11.5\" (0.597 m)  Wt 12 lb 12 oz (5.783 kg)  HC 15.55\" (39.5 cm)  SpO2 98%  BMI 16.23 kg/m2  40 %ile based on WHO (Boys, 0-2 years) length-for-age data using vitals from 1/24/2018.  36 %ile based on WHO (Boys, 0-2 years) weight-for-age data using vitals from 1/24/2018.  35 %ile based on WHO (Boys, 0-2 years) head circumference-for-age data using vitals from 1/24/2018.  GENERAL: Active, alert, in no acute distress.  SKIN: Clear. No significant rash, abnormal pigmentation or lesions  HEAD: Normocephalic. Normal fontanels and sutures.  EYES: Conjunctivae and cornea normal. Red reflexes present bilaterally.  EARS: Normal canals. Tympanic membranes are normal; gray and translucent.  R preauricular skin tag, bilateral preauricular pit.   NOSE: Normal without discharge.  MOUTH/THROAT: Clear. No oral lesions.  NECK: Supple, no masses.  LYMPH NODES: No adenopathy  LUNGS: Clear. No rales, rhonchi, wheezing or retractions  HEART: Regular rhythm. Normal S1/S2. No murmurs. Normal femoral pulses.  ABDOMEN: Soft, non-tender, not distended, no masses or hepatosplenomegaly. Normal umbilicus and bowel sounds.   GENITALIA: Normal male external genitalia. Rommel stage I,  Testes descended bilateraly, no hernia or hydrocele.    EXTREMITIES: Hips normal with negative Ortolani and Garcia. Symmetric creases and  no deformities  NEUROLOGIC: Normal tone throughout. Normal reflexes for age    ASSESSMENT/PLAN:   1. Encounter for routine child health examination w/o abnormal findings  Normal growth, development.     - DTAP - " HIB - IPV VACCINE, IM USE (Pentacel) [48722]  - HEPATITIS B VACCINE,PED/ADOL,IM [32245]  - PNEUMOCOCCAL CONJ VACCINE 13 VALENT IM [[id number]]  - ROTAVIRUS VACC 2 DOSE ORAL  - DEVELOPMENTAL TEST, BOND    2. Preauricular skin tag  3. Congenital preauricular pit    - OTOLARYNGOLOGY REFERRAL        Anticipatory Guidance  The following topics were discussed:  SOCIAL/ FAMILY    return to work    sibling rivalry    crying/ fussiness    calming techniques    talk or sing to baby/ music  NUTRITION:    delay solid food    pumping/ introducing bottle  HEALTH/ SAFETY:    fevers    skin care    spitting up    temperature taking    sleep patterns    smoking exposure    safe crib    Preventive Care Plan  Immunizations     See orders in EpicCare.  I reviewed the signs and symptoms of adverse effects and when to seek medical care if they should arise.  Referrals/Ongoing Specialty care: Yes, see orders in EpicCare  See other orders in EpicCare    FOLLOW-UP:      4 month Preventive Care visit    MONIKA Martinez [facility]

## 2021-04-08 NOTE — HISTORY OF PRESENT ILLNESS
[FreeTextEntry1] : Pt starting to feel better, but still not at his goal for mental/physical health\par \par T2DM\par Severity: poor control\par Onset: routine \par Duration: approx 4-5 years\par Modifying Factors: never has used insulin before\par \par SMBG\par Checks FS 3x a day\par On average fasting 100-200\par Before lunch high 100s, low 200s\par Before dinner high 100s, low 200s\par \par Fs in office 171-two eggs \par \par Current drug regimen\par MFN 1000 MG BID\par Jaridance 25 mg daily\par Alogliptin 25 mg daily\par Glimepiride 2 mg daily \par \par Could not tolerate Victoza due to extreme N/V\par \par Eye exam: 2020- denies DR\par Foot exam: Saw podiatry- +severe neuropathy, on gabapentin \par Kidney disease: denies \par Heart disease: denies but FH, father with CAD\par \par Weight: 8 lb weight loss \par Diet:\par goes to bed at 4-5 am due to anxiety, stopped binging at night \par B- glucerna diabetic shake \par L- doesn’t eat lunch\par D- pbj \par S- does not snack, grapes \par Drink- water \par Exercise: goes for walks and does weight training 3x a week \par Smoking: a pack a day smoker \par \par HLD\par Gemfibrizol 600 mg daily-just restarted\par Atorvastatin 20 mg daily \par FH of high triglycerides\par Need updated lipid panel\par \par HTN\par 142/88\par On no medications\par \par Low Testosterone \par Approx 9 years\par Has an 18 year old son\par On 80 mg of testosterone every week\par Had brain MRI 2016 which was negative for pitutiary adenoma \par FH : Dad on androgel and felt very angry \par \par Low Vitamin D\par 50,000 IU supplement weekly\par

## 2021-04-15 ENCOUNTER — APPOINTMENT (OUTPATIENT)
Dept: ENDOCRINOLOGY | Facility: CLINIC | Age: 46
End: 2021-04-15
Payer: MEDICAID

## 2021-04-15 VITALS
SYSTOLIC BLOOD PRESSURE: 122 MMHG | HEART RATE: 78 BPM | DIASTOLIC BLOOD PRESSURE: 70 MMHG | WEIGHT: 230 LBS | BODY MASS INDEX: 32.2 KG/M2 | HEIGHT: 71 IN | OXYGEN SATURATION: 98 %

## 2021-04-15 LAB — GLUCOSE BLDC GLUCOMTR-MCNC: 129

## 2021-04-15 PROCEDURE — 99072 ADDL SUPL MATRL&STAF TM PHE: CPT

## 2021-04-15 PROCEDURE — 82962 GLUCOSE BLOOD TEST: CPT

## 2021-04-15 PROCEDURE — 99214 OFFICE O/P EST MOD 30 MIN: CPT | Mod: 25

## 2021-04-15 PROCEDURE — 96372 THER/PROPH/DIAG INJ SC/IM: CPT

## 2021-04-15 RX ORDER — TESTOSTERONE CYPIONATE 200 MG/ML
200 INJECTION, SOLUTION INTRAMUSCULAR
Refills: 0 | Status: COMPLETED | OUTPATIENT
Start: 2021-04-15

## 2021-04-15 RX ADMIN — TESTOSTERONE CYPIONATE 0 MG/ML: 200 INJECTION INTRAMUSCULAR at 00:00

## 2021-04-15 NOTE — REVIEW OF SYSTEMS
[Recent Weight Loss (___ Lbs)] : recent weight loss: [unfilled] lbs [Diarrhea] : diarrhea [Hesistancy] : hesitancy [Fatigue] : no fatigue [Dysphagia] : no dysphagia [Neck Pain] : no neck pain [Dysphonia] : no dysphonia [Chest Pain] : no chest pain [Shortness Of Breath] : no shortness of breath [Constipation] : no constipation [Polydipsia] : no polydipsia [FreeTextEntry2] : now sleeping through the night

## 2021-04-15 NOTE — ASSESSMENT
[FreeTextEntry1] : T2DM\par -Improvement noted, appears blood sugars may be at goal (no log or meter present today)\par -Pt appears to be in better spirits, good attitude and feels proud of his improvements\par -Continue Glimepriride 2 mg daily \par -Continue Jardiance 25 mg daily\par -Continue MFN 1000 mg BID\par -Continue Alogliptin 25 mg daily\par -Continue checking BS 2-3x a day, meter sent to pharmacy. Send logs in every 2 weeks. Improvement noted\par -Continue CDE apt for diet teach\par -Increase exercise as tolerated, goal of 30 mins a day 5x a day\par -Continue to follow up with all specialists including ophtho, podiatry, neurology \par \par \par HTN\par -Will continue to monitor, likely will need aceI for kidney protection \par \par \par HLD\par -Continue Gemfibrozil and statin- need updated lipid panel\par \par \par Low Testosterone \par -Continue Testosterone injections 80 mg every 1 week\par \par Pt now using medical marijuana and feels neuropathys are better.\par Pt biggest complaint is urinary hesistancy- feels flomax didn’t help, will follow up with urologist \par \par Fasting labs needed in May- mid week during a testosterone cycle\par RTO 3 months

## 2021-04-15 NOTE — HISTORY OF PRESENT ILLNESS
[FreeTextEntry1] : Pt starting to feel better, but still not at his goal for mental/physical health\par \par T2DM\par Severity: poor control\par Onset: routine \par Duration: approx 4-5 years\par Modifying Factors: never has used insulin before\par \par SMBG\par Checks FS 3x a day\par On average 100s\par \par Fs in office 129\par \par Current drug regimen\par MFN 1000 MG BID\par Jaridance 25 mg daily\par Alogliptin 25 mg daily\par Glimepiride 2 mg daily \par \par Could not tolerate Victoza due to extreme N/V\par \par Eye exam: 2020- denies DR\par Foot exam: Saw podiatry- +severe neuropathy, on gabapentin \par Kidney disease: denies \par Heart disease: denies but FH, father with CAD\par \par Weight: did not get in the scale today but feels lighter in his clothes \par Diet:\par B- glucerna diabetic shake \par L- doesn’t eat lunch\par D- pbj \par S- does not snack, grapes \par Drink- water \par Exercise: goes for walks and does weight training 3x a week \par Smoking: a pack a day smoker \par \par HLD\par Gemfibrizol 600 mg daily-just restarted\par Atorvastatin 20 mg daily \par FH of high triglycerides\par Need updated lipid panel\par \par HTN\par 122/70\par On no medications\par \par Low Testosterone \par Approx 9 years\par Has an 18 year old son\par On 80 mg of testosterone every week\par Had brain MRI 2016 which was negative for pitutiary adenoma \par FH : Dad on androgel and felt very angry \par \par Low Vitamin D\par 50,000 IU supplement weekly\par

## 2021-04-15 NOTE — PHYSICAL EXAM

## 2021-04-19 ENCOUNTER — RX RENEWAL (OUTPATIENT)
Age: 46
End: 2021-04-19

## 2021-04-22 ENCOUNTER — APPOINTMENT (OUTPATIENT)
Dept: ENDOCRINOLOGY | Facility: CLINIC | Age: 46
End: 2021-04-22
Payer: MEDICAID

## 2021-04-22 PROCEDURE — 99072 ADDL SUPL MATRL&STAF TM PHE: CPT

## 2021-04-22 PROCEDURE — 96372 THER/PROPH/DIAG INJ SC/IM: CPT

## 2021-04-22 RX ORDER — TESTOSTERONE CYPIONATE 200 MG/ML
200 INJECTION, SOLUTION INTRAMUSCULAR
Refills: 0 | Status: COMPLETED | OUTPATIENT
Start: 2021-04-22

## 2021-04-29 ENCOUNTER — APPOINTMENT (OUTPATIENT)
Dept: ENDOCRINOLOGY | Facility: CLINIC | Age: 46
End: 2021-04-29
Payer: MEDICAID

## 2021-04-29 PROCEDURE — 96372 THER/PROPH/DIAG INJ SC/IM: CPT

## 2021-04-29 PROCEDURE — 99072 ADDL SUPL MATRL&STAF TM PHE: CPT

## 2021-04-29 RX ORDER — TESTOSTERONE CYPIONATE 200 MG/ML
200 INJECTION, SOLUTION INTRAMUSCULAR
Refills: 0 | Status: COMPLETED | OUTPATIENT
Start: 2021-04-29

## 2021-04-29 RX ADMIN — TESTOSTERONE CYPIONATE 0 MG/ML: 200 INJECTION INTRAMUSCULAR at 00:00

## 2021-05-06 ENCOUNTER — APPOINTMENT (OUTPATIENT)
Dept: ENDOCRINOLOGY | Facility: CLINIC | Age: 46
End: 2021-05-06
Payer: MEDICAID

## 2021-05-06 PROCEDURE — 96372 THER/PROPH/DIAG INJ SC/IM: CPT

## 2021-05-06 PROCEDURE — 99072 ADDL SUPL MATRL&STAF TM PHE: CPT

## 2021-05-06 RX ORDER — TESTOSTERONE CYPIONATE 200 MG/ML
200 INJECTION, SOLUTION INTRAMUSCULAR
Refills: 0 | Status: COMPLETED | OUTPATIENT
Start: 2021-05-06

## 2021-05-06 RX ADMIN — TESTOSTERONE CYPIONATE 0 MG/ML: 200 INJECTION INTRAMUSCULAR at 00:00

## 2021-05-13 ENCOUNTER — APPOINTMENT (OUTPATIENT)
Dept: ENDOCRINOLOGY | Facility: CLINIC | Age: 46
End: 2021-05-13
Payer: MEDICAID

## 2021-05-13 PROCEDURE — 96372 THER/PROPH/DIAG INJ SC/IM: CPT

## 2021-05-13 RX ORDER — TESTOSTERONE CYPIONATE 200 MG/ML
200 INJECTION, SOLUTION INTRAMUSCULAR
Refills: 0 | Status: COMPLETED | OUTPATIENT
Start: 2021-05-13

## 2021-05-13 RX ADMIN — TESTOSTERONE CYPIONATE 0 MG/ML: 200 INJECTION INTRAMUSCULAR at 00:00

## 2021-05-20 ENCOUNTER — APPOINTMENT (OUTPATIENT)
Dept: ENDOCRINOLOGY | Facility: CLINIC | Age: 46
End: 2021-05-20
Payer: MEDICAID

## 2021-05-20 PROCEDURE — 96372 THER/PROPH/DIAG INJ SC/IM: CPT

## 2021-05-20 RX ORDER — TESTOSTERONE CYPIONATE 200 MG/ML
200 INJECTION, SOLUTION INTRAMUSCULAR
Refills: 0 | Status: COMPLETED | OUTPATIENT
Start: 2021-05-20

## 2021-05-20 RX ADMIN — TESTOSTERONE CYPIONATE 0 MG/ML: 200 INJECTION INTRAMUSCULAR at 00:00

## 2021-05-27 ENCOUNTER — APPOINTMENT (OUTPATIENT)
Dept: ENDOCRINOLOGY | Facility: CLINIC | Age: 46
End: 2021-05-27
Payer: MEDICAID

## 2021-05-27 PROCEDURE — 96372 THER/PROPH/DIAG INJ SC/IM: CPT

## 2021-05-27 RX ORDER — TESTOSTERONE CYPIONATE 200 MG/ML
200 INJECTION, SOLUTION INTRAMUSCULAR
Refills: 0 | Status: COMPLETED | OUTPATIENT
Start: 2021-05-27

## 2021-05-27 RX ADMIN — TESTOSTERONE CYPIONATE 0 MG/ML: 200 INJECTION, SOLUTION INTRAMUSCULAR at 00:00

## 2021-06-03 ENCOUNTER — APPOINTMENT (OUTPATIENT)
Dept: ENDOCRINOLOGY | Facility: CLINIC | Age: 46
End: 2021-06-03
Payer: MEDICAID

## 2021-06-03 PROCEDURE — 96372 THER/PROPH/DIAG INJ SC/IM: CPT

## 2021-06-03 RX ORDER — TESTOSTERONE CYPIONATE 200 MG/ML
200 INJECTION, SOLUTION INTRAMUSCULAR
Refills: 0 | Status: COMPLETED | OUTPATIENT
Start: 2021-06-03

## 2021-06-10 ENCOUNTER — APPOINTMENT (OUTPATIENT)
Dept: ENDOCRINOLOGY | Facility: CLINIC | Age: 46
End: 2021-06-10
Payer: MEDICAID

## 2021-06-10 PROCEDURE — 96372 THER/PROPH/DIAG INJ SC/IM: CPT

## 2021-06-10 RX ORDER — TESTOSTERONE CYPIONATE 200 MG/ML
200 INJECTION, SOLUTION INTRAMUSCULAR
Refills: 0 | Status: COMPLETED | OUTPATIENT
Start: 2021-06-10

## 2021-06-10 RX ADMIN — TESTOSTERONE CYPIONATE 0 MG/ML: 200 INJECTION, SOLUTION INTRAMUSCULAR at 00:00

## 2021-06-15 ENCOUNTER — RX RENEWAL (OUTPATIENT)
Age: 46
End: 2021-06-15

## 2021-06-17 ENCOUNTER — APPOINTMENT (OUTPATIENT)
Dept: ENDOCRINOLOGY | Facility: CLINIC | Age: 46
End: 2021-06-17
Payer: MEDICAID

## 2021-06-17 PROCEDURE — 96372 THER/PROPH/DIAG INJ SC/IM: CPT

## 2021-06-17 RX ORDER — TESTOSTERONE CYPIONATE 200 MG/ML
200 INJECTION, SOLUTION INTRAMUSCULAR
Refills: 0 | Status: COMPLETED | OUTPATIENT
Start: 2021-06-17

## 2021-06-17 RX ADMIN — TESTOSTERONE CYPIONATE 0 MG/ML: 200 INJECTION, SOLUTION INTRAMUSCULAR at 00:00

## 2021-06-24 ENCOUNTER — APPOINTMENT (OUTPATIENT)
Dept: ENDOCRINOLOGY | Facility: CLINIC | Age: 46
End: 2021-06-24
Payer: MEDICAID

## 2021-06-24 PROCEDURE — 96372 THER/PROPH/DIAG INJ SC/IM: CPT

## 2021-06-24 RX ORDER — TESTOSTERONE CYPIONATE 200 MG/ML
200 INJECTION, SOLUTION INTRAMUSCULAR
Refills: 0 | Status: COMPLETED | OUTPATIENT
Start: 2021-06-24

## 2021-06-24 RX ADMIN — TESTOSTERONE CYPIONATE 0 MG/ML: 200 INJECTION INTRAMUSCULAR at 00:00

## 2021-07-01 ENCOUNTER — APPOINTMENT (OUTPATIENT)
Dept: ENDOCRINOLOGY | Facility: CLINIC | Age: 46
End: 2021-07-01

## 2021-07-02 ENCOUNTER — APPOINTMENT (OUTPATIENT)
Dept: ENDOCRINOLOGY | Facility: CLINIC | Age: 46
End: 2021-07-02
Payer: MEDICAID

## 2021-07-02 ENCOUNTER — APPOINTMENT (OUTPATIENT)
Dept: ENDOCRINOLOGY | Facility: CLINIC | Age: 46
End: 2021-07-02

## 2021-07-02 PROCEDURE — 96372 THER/PROPH/DIAG INJ SC/IM: CPT

## 2021-07-02 RX ORDER — TESTOSTERONE CYPIONATE 200 MG/ML
200 INJECTION, SOLUTION INTRAMUSCULAR
Refills: 0 | Status: COMPLETED | OUTPATIENT
Start: 2021-07-02

## 2021-07-02 RX ADMIN — TESTOSTERONE CYPIONATE 0 MG/ML: 200 INJECTION INTRAMUSCULAR at 00:00

## 2021-07-09 ENCOUNTER — APPOINTMENT (OUTPATIENT)
Dept: ENDOCRINOLOGY | Facility: CLINIC | Age: 46
End: 2021-07-09
Payer: MEDICAID

## 2021-07-09 PROCEDURE — 96372 THER/PROPH/DIAG INJ SC/IM: CPT

## 2021-07-09 RX ORDER — TESTOSTERONE CYPIONATE 200 MG/ML
200 INJECTION, SOLUTION INTRAMUSCULAR
Refills: 0 | Status: COMPLETED | OUTPATIENT
Start: 2021-07-09

## 2021-07-09 RX ADMIN — TESTOSTERONE CYPIONATE 0 MG/ML: 200 INJECTION INTRAMUSCULAR at 00:00

## 2021-07-12 ENCOUNTER — RX RENEWAL (OUTPATIENT)
Age: 46
End: 2021-07-12

## 2021-07-15 ENCOUNTER — APPOINTMENT (OUTPATIENT)
Dept: ENDOCRINOLOGY | Facility: CLINIC | Age: 46
End: 2021-07-15
Payer: MEDICAID

## 2021-07-15 VITALS
WEIGHT: 230 LBS | SYSTOLIC BLOOD PRESSURE: 124 MMHG | DIASTOLIC BLOOD PRESSURE: 64 MMHG | BODY MASS INDEX: 32.2 KG/M2 | HEIGHT: 71 IN | OXYGEN SATURATION: 97 % | HEART RATE: 90 BPM

## 2021-07-15 LAB
GLUCOSE BLDC GLUCOMTR-MCNC: 228
HBA1C MFR BLD HPLC: 6.6
LDLC SERPL DIRECT ASSAY-MCNC: 45

## 2021-07-15 PROCEDURE — 99214 OFFICE O/P EST MOD 30 MIN: CPT | Mod: 25

## 2021-07-15 PROCEDURE — 82962 GLUCOSE BLOOD TEST: CPT

## 2021-07-15 NOTE — HISTORY OF PRESENT ILLNESS
[FreeTextEntry1] : Pt starting to feel better, but still not at his goal for mental/physical health\par Pt has court tomorrow, does not think he will go to half-way. \par \par T2DM\par Severity: poor control\par Onset: routine \par Duration: approx 4-5 years\par Modifying Factors: never has used insulin before\par \par SMBG\par Checks FS 3x a day\par On average 100s, rare 200s\par \par Fs in office 228- 2 ego waffle\par \par HGA1C 6.6\par \par Current drug regimen\par MFN 1000 MG BID\par Jaridance 25 mg daily\par Alogliptin 25 mg daily\par Glimepiride 2 mg daily \par \par Could not tolerate Victoza due to extreme N/V\par \par Eye exam: 2020- denies DR\par Foot exam: Saw podiatry- +severe neuropathy, on gabapentin \par Kidney disease: denies \par Heart disease: denies but FH, father with CAD\par \par Weight:stable\par Diet:\par B- glucerna diabetic shake \par L- doesn’t eat lunch\par D- pbj \par S- does not snack, grapes \par Drink- water \par Exercise: goes for walks and does weight training 3x a week \par Smoking: a pack a day smoker \par \par HLD\par Gemfibrizol 600 mg daily-just restarted\par Atorvastatin 20 mg daily \par FH of high triglycerides\par Triglycerides 287, HDL 26, LDL 44, Total cholesterol 115\par \par HTN\par 124/64\par On no medications\par \par Low Testosterone \par Approx 9 years\par Has an 18 year old son\par On 80 mg of testosterone every week\par Had brain MRI 2016 which was negative for pitutiary adenoma \par FH : Dad on androgel and felt very angry \par Total testosterone 816, Free Testosterone 29.2\par No longer needs nap every day\par \par Low Vitamin D\par 50,000 IU supplement weekly\par On labs 36.6

## 2021-07-15 NOTE — PHYSICAL EXAM

## 2021-07-15 NOTE — ASSESSMENT
[FreeTextEntry1] : T2DM\par -Improvement noted, pt doing well! \par -Pt appears to be in better spirits, good attitude and feels proud of his improvements\par -Continue Glimepriride 2 mg daily \par -Continue Jardiance 25 mg daily\par -Continue MFN 1000 mg BID\par -Continue Alogliptin 25 mg daily\par -Continue checking BS 2-3x a day, meter sent to pharmacy. Send logs in every 2 weeks. Improvement noted. If blood sugars begin to trend low, pt will call office for med changes\par -Increase exercise as tolerated, goal of 30 mins a day 5x a day\par -Continue to follow up with all specialists including ophtho, podiatry, neurology \par \par \par HTN\par -Will continue to monitor, likely will need aceI for kidney protection \par \par \par HLD\par -Continue Gemfibrozil and statin- triglycerides not at goal\par \par \par Low Testosterone \par -Decrease Testosterone injections .3 ml every 1 week as hormonal panel shows he is on too much Testosterone\par \par Pt now using medical marijuana and feels neuropathys are better.\par Pt biggest complaint is urinary hesistancy- feels flomax didn’t help, will follow up with urologist \par \par Fasting labs needed in 3 months, day 4/7 of testosterone cycle\par RTO 3 months

## 2021-07-15 NOTE — REVIEW OF SYSTEMS
[Recent Weight Loss (___ Lbs)] : recent weight loss: [unfilled] lbs [Erectile Dysfunction] : erectile dysfunction [Fatigue] : no fatigue [Recent Weight Gain (___ Lbs)] : no recent weight gain [Visual Field Defect] : no visual field defect [Blurred Vision] : no blurred vision [Chest Pain] : no chest pain [Shortness Of Breath] : no shortness of breath [Constipation] : no constipation [Diarrhea] : no diarrhea [Polydipsia] : no polydipsia [FreeTextEntry8] : +hesistancy

## 2021-07-16 ENCOUNTER — APPOINTMENT (OUTPATIENT)
Dept: ENDOCRINOLOGY | Facility: CLINIC | Age: 46
End: 2021-07-16
Payer: MEDICAID

## 2021-07-16 PROCEDURE — 96372 THER/PROPH/DIAG INJ SC/IM: CPT

## 2021-07-16 RX ORDER — TESTOSTERONE CYPIONATE 200 MG/ML
200 INJECTION, SOLUTION INTRAMUSCULAR
Refills: 0 | Status: COMPLETED | OUTPATIENT
Start: 2021-07-16

## 2021-07-16 RX ORDER — TESTOSTERONE CYPIONATE 200 MG/ML
200 INJECTION, SOLUTION INTRAMUSCULAR
Qty: 0.3 | Refills: 0 | Status: DISCONTINUED | COMMUNITY
Start: 2020-08-25 | End: 2021-07-16

## 2021-07-16 RX ADMIN — TESTOSTERONE CYPIONATE 0 MG/ML: 200 INJECTION INTRAMUSCULAR at 00:00

## 2021-07-23 ENCOUNTER — APPOINTMENT (OUTPATIENT)
Dept: ENDOCRINOLOGY | Facility: CLINIC | Age: 46
End: 2021-07-23
Payer: MEDICAID

## 2021-07-23 PROCEDURE — 96372 THER/PROPH/DIAG INJ SC/IM: CPT

## 2021-07-23 RX ORDER — TESTOSTERONE CYPIONATE 200 MG/ML
200 INJECTION, SOLUTION INTRAMUSCULAR
Refills: 0 | Status: COMPLETED | OUTPATIENT
Start: 2021-07-23

## 2021-07-23 RX ADMIN — TESTOSTERONE CYPIONATE 0 MG/ML: 200 INJECTION INTRAMUSCULAR at 00:00

## 2021-07-30 ENCOUNTER — APPOINTMENT (OUTPATIENT)
Dept: ENDOCRINOLOGY | Facility: CLINIC | Age: 46
End: 2021-07-30
Payer: MEDICAID

## 2021-07-30 PROCEDURE — 96372 THER/PROPH/DIAG INJ SC/IM: CPT

## 2021-07-30 RX ORDER — TESTOSTERONE CYPIONATE 200 MG/ML
200 INJECTION, SOLUTION INTRAMUSCULAR
Refills: 0 | Status: COMPLETED | OUTPATIENT
Start: 2021-07-30

## 2021-07-30 RX ADMIN — TESTOSTERONE CYPIONATE 0 MG/ML: 200 INJECTION INTRAMUSCULAR at 00:00

## 2021-08-06 ENCOUNTER — APPOINTMENT (OUTPATIENT)
Dept: ENDOCRINOLOGY | Facility: CLINIC | Age: 46
End: 2021-08-06
Payer: MEDICAID

## 2021-08-06 PROCEDURE — 96372 THER/PROPH/DIAG INJ SC/IM: CPT

## 2021-08-06 RX ORDER — TESTOSTERONE CYPIONATE 200 MG/ML
200 INJECTION, SOLUTION INTRAMUSCULAR
Refills: 0 | Status: COMPLETED | OUTPATIENT
Start: 2021-08-06

## 2021-08-06 RX ADMIN — TESTOSTERONE CYPIONATE 0 MG/ML: 200 INJECTION INTRAMUSCULAR at 00:00

## 2021-08-13 ENCOUNTER — APPOINTMENT (OUTPATIENT)
Dept: ENDOCRINOLOGY | Facility: CLINIC | Age: 46
End: 2021-08-13
Payer: MEDICAID

## 2021-08-13 PROCEDURE — 96372 THER/PROPH/DIAG INJ SC/IM: CPT

## 2021-08-13 RX ORDER — TESTOSTERONE CYPIONATE 200 MG/ML
200 INJECTION, SOLUTION INTRAMUSCULAR
Refills: 0 | Status: COMPLETED | OUTPATIENT
Start: 2021-08-13

## 2021-08-13 RX ADMIN — TESTOSTERONE CYPIONATE 0 MG/ML: 200 INJECTION INTRAMUSCULAR at 00:00

## 2021-08-20 ENCOUNTER — APPOINTMENT (OUTPATIENT)
Dept: ENDOCRINOLOGY | Facility: CLINIC | Age: 46
End: 2021-08-20
Payer: MEDICAID

## 2021-08-20 PROCEDURE — 96372 THER/PROPH/DIAG INJ SC/IM: CPT

## 2021-08-20 RX ORDER — TESTOSTERONE CYPIONATE 200 MG/ML
200 INJECTION, SOLUTION INTRAMUSCULAR
Refills: 0 | Status: COMPLETED | OUTPATIENT
Start: 2021-08-20

## 2021-08-20 RX ADMIN — TESTOSTERONE CYPIONATE 0 MG/ML: 200 INJECTION INTRAMUSCULAR at 00:00

## 2021-08-27 ENCOUNTER — APPOINTMENT (OUTPATIENT)
Dept: ENDOCRINOLOGY | Facility: CLINIC | Age: 46
End: 2021-08-27
Payer: MEDICAID

## 2021-08-27 PROCEDURE — 96372 THER/PROPH/DIAG INJ SC/IM: CPT

## 2021-08-27 RX ORDER — TESTOSTERONE CYPIONATE 200 MG/ML
200 INJECTION, SOLUTION INTRAMUSCULAR
Refills: 0 | Status: COMPLETED | OUTPATIENT
Start: 2021-08-27

## 2021-08-27 RX ADMIN — TESTOSTERONE CYPIONATE 0 MG/ML: 200 INJECTION INTRAMUSCULAR at 00:00

## 2021-09-03 ENCOUNTER — APPOINTMENT (OUTPATIENT)
Dept: ENDOCRINOLOGY | Facility: CLINIC | Age: 46
End: 2021-09-03
Payer: MEDICAID

## 2021-09-03 PROCEDURE — 96372 THER/PROPH/DIAG INJ SC/IM: CPT

## 2021-09-03 RX ORDER — TESTOSTERONE CYPIONATE 200 MG/ML
200 INJECTION, SOLUTION INTRAMUSCULAR
Refills: 0 | Status: COMPLETED | OUTPATIENT
Start: 2021-09-03

## 2021-09-03 RX ADMIN — TESTOSTERONE CYPIONATE 0 MG/ML: 200 INJECTION INTRAMUSCULAR at 00:00

## 2021-09-10 ENCOUNTER — APPOINTMENT (OUTPATIENT)
Dept: ENDOCRINOLOGY | Facility: CLINIC | Age: 46
End: 2021-09-10
Payer: MEDICAID

## 2021-09-10 PROCEDURE — 96372 THER/PROPH/DIAG INJ SC/IM: CPT

## 2021-09-10 RX ORDER — TESTOSTERONE CYPIONATE 200 MG/ML
200 INJECTION, SOLUTION INTRAMUSCULAR
Refills: 0 | Status: COMPLETED | OUTPATIENT
Start: 2021-09-10

## 2021-09-10 RX ADMIN — TESTOSTERONE CYPIONATE 0 MG/ML: 200 INJECTION INTRAMUSCULAR at 00:00

## 2021-09-17 ENCOUNTER — APPOINTMENT (OUTPATIENT)
Dept: ENDOCRINOLOGY | Facility: CLINIC | Age: 46
End: 2021-09-17
Payer: MEDICAID

## 2021-09-17 PROCEDURE — 96372 THER/PROPH/DIAG INJ SC/IM: CPT

## 2021-09-17 RX ORDER — TESTOSTERONE CYPIONATE 200 MG/ML
200 INJECTION, SOLUTION INTRAMUSCULAR
Refills: 0 | Status: COMPLETED | OUTPATIENT
Start: 2021-09-17

## 2021-09-17 RX ADMIN — TESTOSTERONE CYPIONATE 0 MG/ML: 200 INJECTION INTRAMUSCULAR at 00:00

## 2021-09-24 ENCOUNTER — APPOINTMENT (OUTPATIENT)
Dept: ENDOCRINOLOGY | Facility: CLINIC | Age: 46
End: 2021-09-24
Payer: MEDICAID

## 2021-09-24 PROCEDURE — 96372 THER/PROPH/DIAG INJ SC/IM: CPT

## 2021-09-24 RX ORDER — TESTOSTERONE CYPIONATE 200 MG/ML
200 INJECTION, SOLUTION INTRAMUSCULAR
Refills: 0 | Status: COMPLETED | OUTPATIENT
Start: 2021-09-24

## 2021-09-24 RX ADMIN — TESTOSTERONE CYPIONATE 0 MG/ML: 200 INJECTION INTRAMUSCULAR at 00:00

## 2021-10-04 ENCOUNTER — APPOINTMENT (OUTPATIENT)
Dept: ENDOCRINOLOGY | Facility: CLINIC | Age: 46
End: 2021-10-04

## 2021-10-08 ENCOUNTER — APPOINTMENT (OUTPATIENT)
Dept: ENDOCRINOLOGY | Facility: CLINIC | Age: 46
End: 2021-10-08
Payer: MEDICAID

## 2021-10-08 PROCEDURE — 96372 THER/PROPH/DIAG INJ SC/IM: CPT

## 2021-10-08 RX ORDER — TESTOSTERONE CYPIONATE 200 MG/ML
200 INJECTION, SOLUTION INTRAMUSCULAR
Refills: 0 | Status: COMPLETED | OUTPATIENT
Start: 2021-10-08

## 2021-10-08 RX ADMIN — TESTOSTERONE CYPIONATE 0 MG/ML: 200 INJECTION INTRAMUSCULAR at 00:00

## 2021-10-15 ENCOUNTER — APPOINTMENT (OUTPATIENT)
Dept: ENDOCRINOLOGY | Facility: CLINIC | Age: 46
End: 2021-10-15
Payer: MEDICAID

## 2021-10-15 PROCEDURE — 96372 THER/PROPH/DIAG INJ SC/IM: CPT

## 2021-10-15 RX ORDER — TESTOSTERONE CYPIONATE 200 MG/ML
200 INJECTION, SOLUTION INTRAMUSCULAR
Refills: 0 | Status: COMPLETED | OUTPATIENT
Start: 2021-10-15

## 2021-10-15 RX ADMIN — TESTOSTERONE CYPIONATE 0 MG/ML: 200 INJECTION INTRAMUSCULAR at 00:00

## 2021-10-21 ENCOUNTER — APPOINTMENT (OUTPATIENT)
Dept: ENDOCRINOLOGY | Facility: CLINIC | Age: 46
End: 2021-10-21

## 2021-10-22 ENCOUNTER — APPOINTMENT (OUTPATIENT)
Dept: ENDOCRINOLOGY | Facility: CLINIC | Age: 46
End: 2021-10-22
Payer: MEDICAID

## 2021-10-22 PROCEDURE — 96372 THER/PROPH/DIAG INJ SC/IM: CPT

## 2021-10-22 RX ORDER — TESTOSTERONE CYPIONATE 200 MG/ML
200 INJECTION, SOLUTION INTRAMUSCULAR
Refills: 0 | Status: COMPLETED | OUTPATIENT
Start: 2021-10-22

## 2021-10-22 RX ADMIN — TESTOSTERONE CYPIONATE 0 MG/ML: 200 INJECTION INTRAMUSCULAR at 00:00

## 2021-10-29 ENCOUNTER — APPOINTMENT (OUTPATIENT)
Dept: ENDOCRINOLOGY | Facility: CLINIC | Age: 46
End: 2021-10-29
Payer: MEDICAID

## 2021-10-29 PROCEDURE — 96372 THER/PROPH/DIAG INJ SC/IM: CPT

## 2021-10-29 RX ORDER — TESTOSTERONE CYPIONATE 200 MG/ML
200 INJECTION, SOLUTION INTRAMUSCULAR
Refills: 0 | Status: COMPLETED | OUTPATIENT
Start: 2021-10-29

## 2021-10-29 RX ADMIN — TESTOSTERONE CYPIONATE 0 MG/ML: 200 INJECTION INTRAMUSCULAR at 00:00

## 2021-11-05 ENCOUNTER — APPOINTMENT (OUTPATIENT)
Dept: ENDOCRINOLOGY | Facility: CLINIC | Age: 46
End: 2021-11-05

## 2021-11-12 ENCOUNTER — APPOINTMENT (OUTPATIENT)
Dept: ENDOCRINOLOGY | Facility: CLINIC | Age: 46
End: 2021-11-12
Payer: MEDICAID

## 2021-11-12 PROCEDURE — 96372 THER/PROPH/DIAG INJ SC/IM: CPT

## 2021-11-12 RX ORDER — TESTOSTERONE CYPIONATE 200 MG/ML
200 INJECTION, SOLUTION INTRAMUSCULAR
Refills: 0 | Status: COMPLETED | OUTPATIENT
Start: 2021-11-12

## 2021-11-12 RX ADMIN — TESTOSTERONE CYPIONATE 0 MG/ML: 200 INJECTION INTRAMUSCULAR at 00:00

## 2021-11-15 ENCOUNTER — APPOINTMENT (OUTPATIENT)
Dept: ENDOCRINOLOGY | Facility: CLINIC | Age: 46
End: 2021-11-15

## 2021-11-22 ENCOUNTER — APPOINTMENT (OUTPATIENT)
Dept: ENDOCRINOLOGY | Facility: CLINIC | Age: 46
End: 2021-11-22

## 2021-11-29 ENCOUNTER — APPOINTMENT (OUTPATIENT)
Dept: ENDOCRINOLOGY | Facility: CLINIC | Age: 46
End: 2021-11-29

## 2021-12-03 ENCOUNTER — APPOINTMENT (OUTPATIENT)
Dept: ENDOCRINOLOGY | Facility: CLINIC | Age: 46
End: 2021-12-03
Payer: MEDICAID

## 2021-12-03 PROCEDURE — 96372 THER/PROPH/DIAG INJ SC/IM: CPT

## 2021-12-03 RX ORDER — TESTOSTERONE CYPIONATE 200 MG/ML
200 INJECTION, SOLUTION INTRAMUSCULAR
Refills: 0 | Status: COMPLETED | OUTPATIENT
Start: 2021-12-03

## 2021-12-06 ENCOUNTER — APPOINTMENT (OUTPATIENT)
Dept: ENDOCRINOLOGY | Facility: CLINIC | Age: 46
End: 2021-12-06

## 2021-12-08 ENCOUNTER — APPOINTMENT (OUTPATIENT)
Dept: ENDOCRINOLOGY | Facility: CLINIC | Age: 46
End: 2021-12-08

## 2021-12-13 ENCOUNTER — APPOINTMENT (OUTPATIENT)
Dept: ENDOCRINOLOGY | Facility: CLINIC | Age: 46
End: 2021-12-13
Payer: MEDICAID

## 2021-12-13 PROCEDURE — 96372 THER/PROPH/DIAG INJ SC/IM: CPT

## 2021-12-13 RX ORDER — TESTOSTERONE CYPIONATE 200 MG/ML
200 INJECTION, SOLUTION INTRAMUSCULAR
Refills: 0 | Status: COMPLETED | OUTPATIENT
Start: 2021-12-13

## 2021-12-13 RX ADMIN — TESTOSTERONE CYPIONATE 0 MG/ML: 200 INJECTION INTRAMUSCULAR at 00:00

## 2021-12-14 ENCOUNTER — RX RENEWAL (OUTPATIENT)
Age: 46
End: 2021-12-14

## 2021-12-20 ENCOUNTER — APPOINTMENT (OUTPATIENT)
Dept: ENDOCRINOLOGY | Facility: CLINIC | Age: 46
End: 2021-12-20
Payer: MEDICAID

## 2021-12-20 PROCEDURE — 96372 THER/PROPH/DIAG INJ SC/IM: CPT

## 2021-12-20 RX ORDER — TESTOSTERONE CYPIONATE 200 MG/ML
200 INJECTION, SOLUTION INTRAMUSCULAR
Refills: 0 | Status: COMPLETED | OUTPATIENT
Start: 2021-12-20

## 2021-12-30 ENCOUNTER — APPOINTMENT (OUTPATIENT)
Dept: ENDOCRINOLOGY | Facility: CLINIC | Age: 46
End: 2021-12-30

## 2022-01-03 ENCOUNTER — APPOINTMENT (OUTPATIENT)
Dept: ENDOCRINOLOGY | Facility: CLINIC | Age: 47
End: 2022-01-03
Payer: MEDICAID

## 2022-01-03 PROCEDURE — 96372 THER/PROPH/DIAG INJ SC/IM: CPT

## 2022-01-03 RX ORDER — TESTOSTERONE CYPIONATE 200 MG/ML
200 INJECTION, SOLUTION INTRAMUSCULAR
Refills: 0 | Status: COMPLETED | OUTPATIENT
Start: 2022-01-03

## 2022-01-10 ENCOUNTER — APPOINTMENT (OUTPATIENT)
Dept: ENDOCRINOLOGY | Facility: CLINIC | Age: 47
End: 2022-01-10
Payer: MEDICAID

## 2022-01-10 PROCEDURE — 96372 THER/PROPH/DIAG INJ SC/IM: CPT

## 2022-01-10 RX ORDER — TESTOSTERONE CYPIONATE 200 MG/ML
200 INJECTION, SOLUTION INTRAMUSCULAR
Refills: 0 | Status: COMPLETED | OUTPATIENT
Start: 2022-01-10

## 2022-01-10 RX ADMIN — TESTOSTERONE CYPIONATE 0 MG/ML: 200 INJECTION INTRAMUSCULAR at 00:00

## 2022-01-11 ENCOUNTER — RX RENEWAL (OUTPATIENT)
Age: 47
End: 2022-01-11

## 2022-01-17 ENCOUNTER — TRANSCRIPTION ENCOUNTER (OUTPATIENT)
Age: 47
End: 2022-01-17

## 2022-01-17 ENCOUNTER — APPOINTMENT (OUTPATIENT)
Dept: ENDOCRINOLOGY | Facility: CLINIC | Age: 47
End: 2022-01-17
Payer: MEDICAID

## 2022-01-17 PROCEDURE — 96372 THER/PROPH/DIAG INJ SC/IM: CPT

## 2022-01-17 RX ORDER — TESTOSTERONE CYPIONATE 200 MG/ML
200 INJECTION, SOLUTION INTRAMUSCULAR
Refills: 0 | Status: COMPLETED | OUTPATIENT
Start: 2022-01-17

## 2022-01-17 RX ADMIN — TESTOSTERONE CYPIONATE 0 MG/ML: 200 INJECTION INTRAMUSCULAR at 00:00

## 2022-01-19 LAB
HBA1C MFR BLD HPLC: 8.3
LDLC SERPL DIRECT ASSAY-MCNC: 81
MICROALBUMIN/CREAT 24H UR-RTO: 6

## 2022-01-20 ENCOUNTER — APPOINTMENT (OUTPATIENT)
Dept: ENDOCRINOLOGY | Facility: CLINIC | Age: 47
End: 2022-01-20
Payer: MEDICAID

## 2022-01-20 ENCOUNTER — RESULT CHARGE (OUTPATIENT)
Age: 47
End: 2022-01-20

## 2022-01-20 VITALS
SYSTOLIC BLOOD PRESSURE: 120 MMHG | HEART RATE: 78 BPM | OXYGEN SATURATION: 98 % | WEIGHT: 229 LBS | HEIGHT: 71 IN | DIASTOLIC BLOOD PRESSURE: 80 MMHG | BODY MASS INDEX: 32.06 KG/M2

## 2022-01-20 LAB — GLUCOSE BLDC GLUCOMTR-MCNC: 226

## 2022-01-20 PROCEDURE — 82962 GLUCOSE BLOOD TEST: CPT

## 2022-01-20 PROCEDURE — 99214 OFFICE O/P EST MOD 30 MIN: CPT | Mod: 25

## 2022-01-20 NOTE — REVIEW OF SYSTEMS
[Fatigue] : fatigue [Decreased Appetite] : decreased appetite [Constipation] : constipation [Depression] : depression [Anxiety] : anxiety [Stress] : stress [Polydipsia] : polydipsia [Recent Weight Gain (___ Lbs)] : no recent weight gain [Recent Weight Loss (___ Lbs)] : no recent weight loss [Visual Field Defect] : no visual field defect [Blurred Vision] : no blurred vision [Chest Pain] : no chest pain [Shortness Of Breath] : no shortness of breath [Diarrhea] : no diarrhea [Polyuria] : no polyuria [FreeTextEntry9] : +generalized pain

## 2022-01-20 NOTE — PHYSICAL EXAM

## 2022-01-20 NOTE — HISTORY OF PRESENT ILLNESS
[FreeTextEntry1] : Pt admits to being very depressed lately which has worsened his diabetes control. Pt seeking out a therapist. \par \par T2DM\par Severity: poor control\par Onset: routine \par Duration: approx 4-5 years\par Modifying Factors: never has used insulin before\par \par SMBG\par Checks FS 3x a day\par On average 120s fasting, 200s post prandial\par \par Fs in office 226- coffee\par \par HGA1C 8.3 - worse\par \par Current drug regimen\par MFN 1000 MG BID\par Jaridance 25 mg daily\par Alogliptin 25 mg daily\par Glimepiride 2 mg daily \par \par Could not tolerate Victoza due to extreme N/V\par \par Eye exam: 2020- denies DR- apt 2/2022\par Foot exam: Saw podiatry- +severe neuropathy, on gabapentin \par Kidney disease: denies \par Heart disease: denies but FH, father with CAD\par \par Weight:stable\par Diet:- has only been eating potato bread toast lately- no appetite\par Drink- water \par Exercise: has not been exercising \par Smoking: a pack a day smoker \par \par HLD\par Gemfibrizol 600 mg daily\par Atorvastatin 20 mg daily \par FH of high triglycerides\par Triglycerides 556, HDL 23, LDL 81, Total cholesterol 194\par \par HTN\par 120/80\par On no medications\par \par Low Testosterone \par Approx 9 years\par Has an 18 year old son\par On 60 mg of testosterone every week\par Had brain MRI 2016 which was negative for pitutiary adenoma \par FH : Dad on androgel and felt very angry \par Total testosterone 664, Free Testosterone 27.1, Estradiol 54.3 \par \par Low Vitamin D\par 50,000 IU supplement weekly- ran out when this blood work resulted\par On labs 19.2

## 2022-01-20 NOTE — ASSESSMENT
[FreeTextEntry1] : T2DM\par -Control worse but patient admits to not always taking his medications/eating appropriately \par -Continue Glimepriride 2 mg daily \par -Continue Jardiance 25 mg daily\par -Continue MFN 1000 mg BID\par -Continue Alogliptin 25 mg daily\par -Continue checking BS 2-3x a day, meter sent to pharmacy. Send logs in every 2 weeks. If blood sugars begin to trend low, pt will call office for med changes\par -Increase exercise as tolerated, goal of 30 mins a day 5x a day\par -Continue to follow up with all specialists including ophtho, podiatry, neurology \par \par \par HTN\par -Will continue to monitor, likely will need aceI for kidney protection \par \par \par HLD\par -Continue Gemfibrozil and statin- triglycerides not at goal\par -Increase exercise efforts\par \par \par Low Testosterone \par -Continue Testosterone injections .3 ml every 1 week \par \par \par Fasting labs needed in2 months, day 4/7 of testosterone cycle\par RTO 2 months

## 2022-01-24 ENCOUNTER — APPOINTMENT (OUTPATIENT)
Dept: ENDOCRINOLOGY | Facility: CLINIC | Age: 47
End: 2022-01-24
Payer: MEDICAID

## 2022-01-24 PROCEDURE — 96372 THER/PROPH/DIAG INJ SC/IM: CPT

## 2022-01-24 RX ORDER — TESTOSTERONE CYPIONATE 200 MG/ML
200 INJECTION, SOLUTION INTRAMUSCULAR
Refills: 0 | Status: COMPLETED | OUTPATIENT
Start: 2022-01-24

## 2022-01-24 RX ADMIN — TESTOSTERONE CYPIONATE 0 MG/ML: 200 INJECTION INTRAMUSCULAR at 00:00

## 2022-01-31 ENCOUNTER — APPOINTMENT (OUTPATIENT)
Dept: ENDOCRINOLOGY | Facility: CLINIC | Age: 47
End: 2022-01-31
Payer: MEDICAID

## 2022-01-31 PROCEDURE — 96372 THER/PROPH/DIAG INJ SC/IM: CPT

## 2022-01-31 RX ORDER — TESTOSTERONE CYPIONATE 200 MG/ML
200 INJECTION, SOLUTION INTRAMUSCULAR
Refills: 0 | Status: COMPLETED | OUTPATIENT
Start: 2022-01-31

## 2022-01-31 RX ADMIN — TESTOSTERONE CYPIONATE 0 MG/ML: 200 INJECTION INTRAMUSCULAR at 00:00

## 2022-02-07 ENCOUNTER — RX RENEWAL (OUTPATIENT)
Age: 47
End: 2022-02-07

## 2022-02-07 ENCOUNTER — APPOINTMENT (OUTPATIENT)
Dept: ENDOCRINOLOGY | Facility: CLINIC | Age: 47
End: 2022-02-07
Payer: MEDICAID

## 2022-02-07 PROCEDURE — 96372 THER/PROPH/DIAG INJ SC/IM: CPT

## 2022-02-07 RX ORDER — TESTOSTERONE CYPIONATE 200 MG/ML
200 INJECTION, SOLUTION INTRAMUSCULAR
Refills: 0 | Status: COMPLETED | OUTPATIENT
Start: 2022-02-07

## 2022-02-07 RX ADMIN — TESTOSTERONE CYPIONATE 0 MG/ML: 200 INJECTION INTRAMUSCULAR at 00:00

## 2022-02-14 ENCOUNTER — APPOINTMENT (OUTPATIENT)
Dept: ENDOCRINOLOGY | Facility: CLINIC | Age: 47
End: 2022-02-14
Payer: MEDICAID

## 2022-02-14 PROCEDURE — 96372 THER/PROPH/DIAG INJ SC/IM: CPT

## 2022-02-14 RX ORDER — TESTOSTERONE CYPIONATE 200 MG/ML
200 INJECTION, SOLUTION INTRAMUSCULAR
Refills: 0 | Status: COMPLETED | OUTPATIENT
Start: 2022-02-14

## 2022-02-14 RX ADMIN — TESTOSTERONE CYPIONATE 0 MG/ML: 200 INJECTION INTRAMUSCULAR at 00:00

## 2022-02-21 ENCOUNTER — APPOINTMENT (OUTPATIENT)
Dept: ENDOCRINOLOGY | Facility: CLINIC | Age: 47
End: 2022-02-21
Payer: MEDICAID

## 2022-02-21 PROCEDURE — 96372 THER/PROPH/DIAG INJ SC/IM: CPT

## 2022-02-21 RX ORDER — TESTOSTERONE CYPIONATE 200 MG/ML
200 INJECTION, SOLUTION INTRAMUSCULAR
Refills: 0 | Status: COMPLETED | OUTPATIENT
Start: 2022-02-21

## 2022-02-21 RX ADMIN — TESTOSTERONE CYPIONATE 0 MG/ML: 200 INJECTION INTRAMUSCULAR at 00:00

## 2022-02-28 ENCOUNTER — APPOINTMENT (OUTPATIENT)
Dept: ENDOCRINOLOGY | Facility: CLINIC | Age: 47
End: 2022-02-28
Payer: MEDICAID

## 2022-02-28 PROCEDURE — 96372 THER/PROPH/DIAG INJ SC/IM: CPT

## 2022-02-28 RX ORDER — TESTOSTERONE CYPIONATE 200 MG/ML
200 INJECTION, SOLUTION INTRAMUSCULAR
Refills: 0 | Status: COMPLETED | OUTPATIENT
Start: 2022-02-28

## 2022-02-28 RX ADMIN — TESTOSTERONE CYPIONATE 0 MG/ML: 200 INJECTION INTRAMUSCULAR at 00:00

## 2022-03-07 ENCOUNTER — APPOINTMENT (OUTPATIENT)
Dept: ENDOCRINOLOGY | Facility: CLINIC | Age: 47
End: 2022-03-07
Payer: MEDICAID

## 2022-03-07 PROCEDURE — 96372 THER/PROPH/DIAG INJ SC/IM: CPT

## 2022-03-07 RX ORDER — TESTOSTERONE CYPIONATE 200 MG/ML
200 INJECTION, SOLUTION INTRAMUSCULAR
Refills: 0 | Status: COMPLETED | OUTPATIENT
Start: 2022-03-07

## 2022-03-07 RX ADMIN — TESTOSTERONE CYPIONATE 0 MG/ML: 200 INJECTION INTRAMUSCULAR at 00:00

## 2022-03-09 ENCOUNTER — TRANSCRIPTION ENCOUNTER (OUTPATIENT)
Age: 47
End: 2022-03-09

## 2022-03-12 ENCOUNTER — TRANSCRIPTION ENCOUNTER (OUTPATIENT)
Age: 47
End: 2022-03-12

## 2022-03-14 ENCOUNTER — APPOINTMENT (OUTPATIENT)
Dept: ENDOCRINOLOGY | Facility: CLINIC | Age: 47
End: 2022-03-14
Payer: MEDICAID

## 2022-03-14 VITALS
SYSTOLIC BLOOD PRESSURE: 132 MMHG | HEIGHT: 71 IN | WEIGHT: 226 LBS | BODY MASS INDEX: 31.64 KG/M2 | DIASTOLIC BLOOD PRESSURE: 72 MMHG | OXYGEN SATURATION: 98 % | HEART RATE: 92 BPM

## 2022-03-14 LAB
GLUCOSE BLDC GLUCOMTR-MCNC: 223
HBA1C MFR BLD HPLC: 7.4
LDLC SERPL DIRECT ASSAY-MCNC: 84
MICROALBUMIN/CREAT 24H UR-RTO: 4

## 2022-03-14 PROCEDURE — 99214 OFFICE O/P EST MOD 30 MIN: CPT | Mod: 25

## 2022-03-14 PROCEDURE — 96372 THER/PROPH/DIAG INJ SC/IM: CPT

## 2022-03-14 PROCEDURE — 82962 GLUCOSE BLOOD TEST: CPT

## 2022-03-14 RX ORDER — TESTOSTERONE CYPIONATE 200 MG/ML
200 INJECTION, SOLUTION INTRAMUSCULAR
Refills: 0 | Status: COMPLETED | OUTPATIENT
Start: 2022-03-14

## 2022-03-14 RX ADMIN — TESTOSTERONE CYPIONATE 0 MG/ML: 200 INJECTION INTRAMUSCULAR at 00:00

## 2022-03-14 NOTE — ASSESSMENT
[FreeTextEntry1] : T2DM\par -Control improved but DM not at goal \par -Increase Glimepriride to 2 mg BID\par -Continue Jardiance 25 mg daily\par -Continue MFN 1000 mg BID\par -Continue Alogliptin 25 mg daily\par -Continue checking BS 2-3x a day, meter sent to pharmacy. Send logs in every 2 weeks. If blood sugars begin to trend low, pt will call office for med changes\par -Increase exercise as tolerated, goal of 30 mins a day 5x a day\par -Continue to follow up with all specialists including ophtho, podiatry, neurology \par \par \par HTN\par -Will continue to monitor, likely will need aceI for kidney protection \par \par \par HLD\par -Continue Gemfibrozil and statin- triglycerides not at goal but improved \par -Increase exercise efforts\par \par \par Low Testosterone \par -Free testosterone and estradiol elevated, decrease testosterone injection to 50 mg weekly \par \par \par Fasting labs needed in 3 months\par RTO 3 months

## 2022-03-14 NOTE — HISTORY OF PRESENT ILLNESS
[FreeTextEntry1] : Pt still trying to find a therapist he feels he will get along with.\par \par T2DM\par Severity: poor control\par Onset: routine \par Duration: approx 4-5 years\par Modifying Factors: never has used insulin before\par \par SMBG\par Checks FS 2x a day\par On average 120s fasting, 200s post prandial\par \par Fs in office 223- yogurt and coffee \par \par HGA1C 7.4 - improved\par \par Current drug regimen\par MFN 1000 MG BID\par Jaridance 25 mg daily\par Alogliptin 25 mg daily\par Glimepiride 2 mg daily \par \par Could not tolerate Victoza due to extreme N/V\par \par Eye exam: 2020- denies DR- due for 2022\par Foot exam: Saw podiatry- +severe neuropathy, on gabapentin , [place] 4/2022 \par Kidney disease: denies \par Heart disease: denies but FH, father with CAD\par \par Weight: down 3 lbs\par Diet:-eating less bread \par Drink- water \par Exercise: has not been exercising \par Smoking: a pack a day smoker \par \par HLD\par Gemfibrizol 600 mg daily\par Atorvastatin 20 mg daily \par FH of high triglycerides\par Triglycerides 359, HDL 25, LDL 84, Total cholesterol 168\par \par HTN\par 132/72\par On no medications\par \par Low Testosterone \par Approx 9 years\par Has an 18 year old son\par On 60 mg of testosterone every week\par Had brain MRI 2016 which was negative for pitutiary adenoma \par FH : Dad on androgel and felt very angry \par Total testosterone 563, Free Testosterone 23.3, Estradiol 47.1\par \par Low Vitamin D\par 50,000 IU supplement weekly\par On labs 32.3

## 2022-03-14 NOTE — REVIEW OF SYSTEMS
[Recent Weight Loss (___ Lbs)] : recent weight loss: [unfilled] lbs [Polyuria] : polyuria [Hesistancy] : hesitancy [Fatigue] : no fatigue [Chest Pain] : no chest pain [Shortness Of Breath] : no shortness of breath [Constipation] : no constipation [Diarrhea] : no diarrhea [Polydipsia] : no polydipsia

## 2022-03-14 NOTE — PHYSICAL EXAM

## 2022-03-15 ENCOUNTER — TRANSCRIPTION ENCOUNTER (OUTPATIENT)
Age: 47
End: 2022-03-15

## 2022-03-21 ENCOUNTER — APPOINTMENT (OUTPATIENT)
Dept: ENDOCRINOLOGY | Facility: CLINIC | Age: 47
End: 2022-03-21
Payer: MEDICAID

## 2022-03-21 PROCEDURE — 96372 THER/PROPH/DIAG INJ SC/IM: CPT

## 2022-03-21 RX ORDER — TESTOSTERONE CYPIONATE 200 MG/ML
200 INJECTION, SOLUTION INTRAMUSCULAR
Refills: 0 | Status: COMPLETED | OUTPATIENT
Start: 2022-03-21

## 2022-03-21 RX ADMIN — TESTOSTERONE CYPIONATE 0 MG/ML: 200 INJECTION INTRAMUSCULAR at 00:00

## 2022-03-28 ENCOUNTER — APPOINTMENT (OUTPATIENT)
Dept: ENDOCRINOLOGY | Facility: CLINIC | Age: 47
End: 2022-03-28
Payer: MEDICAID

## 2022-03-28 PROCEDURE — 96372 THER/PROPH/DIAG INJ SC/IM: CPT

## 2022-03-28 RX ORDER — TESTOSTERONE CYPIONATE 200 MG/ML
200 INJECTION, SOLUTION INTRAMUSCULAR
Refills: 0 | Status: COMPLETED | OUTPATIENT
Start: 2022-03-28

## 2022-03-28 RX ADMIN — TESTOSTERONE CYPIONATE 0 MG/ML: 200 INJECTION INTRAMUSCULAR at 00:00

## 2022-04-04 ENCOUNTER — APPOINTMENT (OUTPATIENT)
Dept: ENDOCRINOLOGY | Facility: CLINIC | Age: 47
End: 2022-04-04
Payer: MEDICAID

## 2022-04-04 PROCEDURE — 96372 THER/PROPH/DIAG INJ SC/IM: CPT

## 2022-04-04 RX ORDER — TESTOSTERONE CYPIONATE 200 MG/ML
200 INJECTION, SOLUTION INTRAMUSCULAR
Refills: 0 | Status: COMPLETED | OUTPATIENT
Start: 2022-04-04

## 2022-04-11 ENCOUNTER — APPOINTMENT (OUTPATIENT)
Dept: ENDOCRINOLOGY | Facility: CLINIC | Age: 47
End: 2022-04-11
Payer: MEDICAID

## 2022-04-11 PROCEDURE — 96372 THER/PROPH/DIAG INJ SC/IM: CPT

## 2022-04-11 RX ORDER — TESTOSTERONE CYPIONATE 200 MG/ML
200 INJECTION, SOLUTION INTRAMUSCULAR
Refills: 0 | Status: COMPLETED | OUTPATIENT
Start: 2022-04-11

## 2022-04-11 RX ADMIN — TESTOSTERONE CYPIONATE 0 MG/ML: 200 INJECTION INTRAMUSCULAR at 00:00

## 2022-04-18 ENCOUNTER — APPOINTMENT (OUTPATIENT)
Dept: ENDOCRINOLOGY | Facility: CLINIC | Age: 47
End: 2022-04-18
Payer: MEDICAID

## 2022-04-18 PROCEDURE — 96372 THER/PROPH/DIAG INJ SC/IM: CPT

## 2022-04-18 RX ORDER — TESTOSTERONE CYPIONATE 200 MG/ML
200 INJECTION, SOLUTION INTRAMUSCULAR
Refills: 0 | Status: COMPLETED | OUTPATIENT
Start: 2022-04-18

## 2022-04-18 RX ADMIN — TESTOSTERONE CYPIONATE 0 MG/ML: 200 INJECTION INTRAMUSCULAR at 00:00

## 2022-04-25 ENCOUNTER — APPOINTMENT (OUTPATIENT)
Dept: ENDOCRINOLOGY | Facility: CLINIC | Age: 47
End: 2022-04-25
Payer: MEDICAID

## 2022-04-25 PROCEDURE — 96372 THER/PROPH/DIAG INJ SC/IM: CPT

## 2022-04-25 RX ORDER — TESTOSTERONE CYPIONATE 200 MG/ML
200 INJECTION, SOLUTION INTRAMUSCULAR
Refills: 0 | Status: COMPLETED | OUTPATIENT
Start: 2022-04-25

## 2022-04-25 RX ADMIN — TESTOSTERONE CYPIONATE 0 MG/ML: 200 INJECTION INTRAMUSCULAR at 00:00

## 2022-05-02 ENCOUNTER — APPOINTMENT (OUTPATIENT)
Dept: ENDOCRINOLOGY | Facility: CLINIC | Age: 47
End: 2022-05-02
Payer: MEDICAID

## 2022-05-02 ENCOUNTER — TRANSCRIPTION ENCOUNTER (OUTPATIENT)
Age: 47
End: 2022-05-02

## 2022-05-02 PROCEDURE — 96372 THER/PROPH/DIAG INJ SC/IM: CPT

## 2022-05-02 RX ORDER — TESTOSTERONE CYPIONATE 200 MG/ML
200 INJECTION, SOLUTION INTRAMUSCULAR
Refills: 0 | Status: COMPLETED | OUTPATIENT
Start: 2022-05-02

## 2022-05-02 RX ADMIN — TESTOSTERONE CYPIONATE 0 MG/ML: 200 INJECTION INTRAMUSCULAR at 00:00

## 2022-05-09 ENCOUNTER — APPOINTMENT (OUTPATIENT)
Dept: ENDOCRINOLOGY | Facility: CLINIC | Age: 47
End: 2022-05-09
Payer: MEDICAID

## 2022-05-09 PROCEDURE — 96372 THER/PROPH/DIAG INJ SC/IM: CPT

## 2022-05-09 RX ORDER — TESTOSTERONE CYPIONATE 200 MG/ML
200 INJECTION, SOLUTION INTRAMUSCULAR
Refills: 0 | Status: COMPLETED | OUTPATIENT
Start: 2022-05-09

## 2022-05-09 RX ADMIN — TESTOSTERONE CYPIONATE 0 MG/ML: 200 INJECTION INTRAMUSCULAR at 00:00

## 2022-05-16 ENCOUNTER — APPOINTMENT (OUTPATIENT)
Dept: ENDOCRINOLOGY | Facility: CLINIC | Age: 47
End: 2022-05-16

## 2022-05-17 ENCOUNTER — APPOINTMENT (OUTPATIENT)
Dept: ENDOCRINOLOGY | Facility: CLINIC | Age: 47
End: 2022-05-17
Payer: MEDICAID

## 2022-05-17 PROCEDURE — 96372 THER/PROPH/DIAG INJ SC/IM: CPT

## 2022-05-17 RX ORDER — TESTOSTERONE CYPIONATE 200 MG/ML
200 INJECTION, SOLUTION INTRAMUSCULAR
Refills: 0 | Status: COMPLETED | OUTPATIENT
Start: 2022-05-17

## 2022-05-17 RX ADMIN — TESTOSTERONE CYPIONATE 0 MG/ML: 200 INJECTION INTRAMUSCULAR at 00:00

## 2022-05-23 ENCOUNTER — APPOINTMENT (OUTPATIENT)
Dept: ENDOCRINOLOGY | Facility: CLINIC | Age: 47
End: 2022-05-23
Payer: MEDICAID

## 2022-05-23 PROCEDURE — 96372 THER/PROPH/DIAG INJ SC/IM: CPT

## 2022-05-23 RX ORDER — TESTOSTERONE CYPIONATE 200 MG/ML
200 INJECTION, SOLUTION INTRAMUSCULAR
Refills: 0 | Status: COMPLETED | OUTPATIENT
Start: 2022-05-23

## 2022-05-23 RX ADMIN — TESTOSTERONE CYPIONATE 0 MG/ML: 200 INJECTION INTRAMUSCULAR at 00:00

## 2022-05-31 ENCOUNTER — APPOINTMENT (OUTPATIENT)
Dept: ENDOCRINOLOGY | Facility: CLINIC | Age: 47
End: 2022-05-31
Payer: MEDICAID

## 2022-05-31 PROCEDURE — 96372 THER/PROPH/DIAG INJ SC/IM: CPT

## 2022-05-31 RX ORDER — TESTOSTERONE CYPIONATE 200 MG/ML
200 INJECTION, SOLUTION INTRAMUSCULAR
Refills: 0 | Status: COMPLETED | OUTPATIENT
Start: 2022-05-31

## 2022-05-31 RX ADMIN — TESTOSTERONE CYPIONATE 0 MG/ML: 200 INJECTION INTRAMUSCULAR at 00:00

## 2022-06-07 ENCOUNTER — TRANSCRIPTION ENCOUNTER (OUTPATIENT)
Age: 47
End: 2022-06-07

## 2022-06-10 ENCOUNTER — APPOINTMENT (OUTPATIENT)
Dept: ENDOCRINOLOGY | Facility: CLINIC | Age: 47
End: 2022-06-10

## 2022-06-14 ENCOUNTER — APPOINTMENT (OUTPATIENT)
Dept: ENDOCRINOLOGY | Facility: CLINIC | Age: 47
End: 2022-06-14
Payer: MEDICAID

## 2022-06-14 PROCEDURE — 96372 THER/PROPH/DIAG INJ SC/IM: CPT

## 2022-06-14 RX ORDER — TESTOSTERONE CYPIONATE 200 MG/ML
200 INJECTION, SOLUTION INTRAMUSCULAR
Refills: 0 | Status: COMPLETED | OUTPATIENT
Start: 2022-06-14

## 2022-06-14 RX ADMIN — TESTOSTERONE CYPIONATE 0 MG/ML: 200 INJECTION INTRAMUSCULAR at 00:00

## 2022-06-16 ENCOUNTER — APPOINTMENT (OUTPATIENT)
Dept: ENDOCRINOLOGY | Facility: CLINIC | Age: 47
End: 2022-06-16
Payer: MEDICAID

## 2022-06-16 VITALS
DIASTOLIC BLOOD PRESSURE: 70 MMHG | BODY MASS INDEX: 32.2 KG/M2 | WEIGHT: 230 LBS | HEART RATE: 81 BPM | HEIGHT: 71 IN | SYSTOLIC BLOOD PRESSURE: 110 MMHG | OXYGEN SATURATION: 98 %

## 2022-06-16 LAB — GLUCOSE BLDC GLUCOMTR-MCNC: 297

## 2022-06-16 PROCEDURE — 99214 OFFICE O/P EST MOD 30 MIN: CPT | Mod: 25

## 2022-06-16 PROCEDURE — 82962 GLUCOSE BLOOD TEST: CPT

## 2022-06-16 RX ORDER — BUPRENORPHINE AND NALOXONE 8; 2 MG/1; MG/1
8-2 FILM BUCCAL; SUBLINGUAL
Qty: 45 | Refills: 0 | Status: ACTIVE | COMMUNITY
Start: 2022-05-23

## 2022-06-16 NOTE — HISTORY OF PRESENT ILLNESS
[FreeTextEntry1] : Pt still trying to find a therapist he feels he will get along with and takes his insurance. Pt very depressed. \par Actively looking for work but has been turned down from 63 jobs.\par +Knee pain\par \par T2DM\par Severity: poor control\par Onset: routine \par Duration: approx 4-5 years\par Modifying Factors: never has used insulin before\par \par SMBG\par Checks FS 2x a day\par On average 120s fasting, low 200s post prandial\par \par Fs in office 297-ate 2 pieces of toast, pb, and coffee\par \par Need updated HGA1C\par \par Current drug regimen\par MFN 1000 MG BID\par Jaridance 25 mg daily\par Alogliptin 25 mg daily\par Glimepiride 2 mg BID\par \par Could not tolerate Victoza due to extreme N/V\par \par Eye exam: denies DR- due for 7/2022\par Foot exam: Saw podiatry- +severe neuropathy, on gabapentin , [place] 4/2022 \par Kidney disease: denies \par Heart disease: denies but FH, father with CAD\par \par Weight: stable\par Diet:-eating less bread \par Drink- water \par Exercise: has not been exercising , doing a lot of yard work\par Smoking: a pack a day smoker \par \par HLD\par Gemfibrizol 600 mg daily\par Atorvastatin 20 mg daily \par FH of high triglycerides\par Need updated lipid panel\par \par HTN\par BP in office 110/70\par On no medications\par \par Low Testosterone \par Approx 9 years\par Has an 18 year old son\par On 50 mg of testosterone every week\par Had brain MRI 2016 which was negative for pitutiary adenoma \par FH : Dad on androgel and felt very angry \par Need updated testosterone levels\par Had missed a week of injections because his testosterone wasn’t available for \par \par Low Vitamin D\par 50,000 IU supplement weekly\par Need updated levels with labs\par \par Pt has found multiple ticks on him lately

## 2022-06-16 NOTE — ASSESSMENT
[FreeTextEntry1] : T2DM\par -Need updated HGA1C\par -Continue Glimepriride to 2 mg BID\par -Continue Jardiance 25 mg daily\par -Continue MFN 1000 mg BID\par -Continue Alogliptin 25 mg daily\par -Continue checking BS 2-3x a day, meter sent to pharmacy. Send logs in every 2 weeks. If blood sugars begin to trend low, pt will call office for med changes\par -Increase exercise as tolerated, goal of 30 mins a day 5x a day\par -Continue to follow up with all specialists including ophtho, podiatry, neurology \par \par \par HTN\par -Will continue to monitor, likely will need aceI for kidney protection \par \par \par HLD\par -Continue Gemfibrozil and statin-need updated HGA1C \par -Increase exercise efforts\par \par \par Low Testosterone \par -Free testosterone and estradiol elevated, need updated levels in between cycles- day 4/7\par \par Will monitor PSA and tick jennie crews at pt request\par Pt to let me know when he finds a therapist/mental health provider\par \par Fasting labs needed ASAP\par RTO 3 months

## 2022-06-16 NOTE — REVIEW OF SYSTEMS
[Fatigue] : fatigue [Recent Weight Gain (___ Lbs)] : no recent weight gain [Recent Weight Loss (___ Lbs)] : no recent weight loss [Chest Pain] : no chest pain [Shortness Of Breath] : no shortness of breath [Constipation] : no constipation [Diarrhea] : no diarrhea [Polyuria] : no polyuria [Hesistancy] : hesitancy [Polydipsia] : no polydipsia [FreeTextEntry9] : +generalized pain, +neuropathy

## 2022-06-16 NOTE — PHYSICAL EXAM

## 2022-06-21 ENCOUNTER — APPOINTMENT (OUTPATIENT)
Dept: ENDOCRINOLOGY | Facility: CLINIC | Age: 47
End: 2022-06-21
Payer: MEDICAID

## 2022-06-21 PROCEDURE — 96372 THER/PROPH/DIAG INJ SC/IM: CPT

## 2022-06-21 RX ORDER — TESTOSTERONE CYPIONATE 200 MG/ML
200 INJECTION, SOLUTION INTRAMUSCULAR
Refills: 0 | Status: COMPLETED | OUTPATIENT
Start: 2022-06-21

## 2022-06-21 RX ADMIN — TESTOSTERONE CYPIONATE 0 MG/ML: 200 INJECTION INTRAMUSCULAR at 00:00

## 2022-06-28 ENCOUNTER — APPOINTMENT (OUTPATIENT)
Dept: ENDOCRINOLOGY | Facility: CLINIC | Age: 47
End: 2022-06-28
Payer: MEDICAID

## 2022-06-28 PROCEDURE — 96372 THER/PROPH/DIAG INJ SC/IM: CPT

## 2022-06-28 RX ORDER — TESTOSTERONE CYPIONATE 200 MG/ML
200 INJECTION, SOLUTION INTRAMUSCULAR
Refills: 0 | Status: COMPLETED | OUTPATIENT
Start: 2022-06-28

## 2022-06-28 RX ADMIN — TESTOSTERONE CYPIONATE 0 MG/ML: 200 INJECTION INTRAMUSCULAR at 00:00

## 2022-07-05 ENCOUNTER — APPOINTMENT (OUTPATIENT)
Dept: ENDOCRINOLOGY | Facility: CLINIC | Age: 47
End: 2022-07-05

## 2022-07-05 PROCEDURE — 96372 THER/PROPH/DIAG INJ SC/IM: CPT

## 2022-07-05 RX ORDER — TESTOSTERONE CYPIONATE 200 MG/ML
200 INJECTION, SOLUTION INTRAMUSCULAR
Refills: 0 | Status: COMPLETED | OUTPATIENT
Start: 2022-07-05

## 2022-07-05 RX ADMIN — TESTOSTERONE CYPIONATE 0 MG/ML: 200 INJECTION INTRAMUSCULAR at 00:00

## 2022-07-12 ENCOUNTER — APPOINTMENT (OUTPATIENT)
Dept: ENDOCRINOLOGY | Facility: CLINIC | Age: 47
End: 2022-07-12

## 2022-07-12 PROCEDURE — 96372 THER/PROPH/DIAG INJ SC/IM: CPT

## 2022-07-12 RX ORDER — TESTOSTERONE CYPIONATE 200 MG/ML
200 INJECTION, SOLUTION INTRAMUSCULAR
Refills: 0 | Status: COMPLETED | OUTPATIENT
Start: 2022-07-12

## 2022-07-12 RX ADMIN — TESTOSTERONE CYPIONATE 0 MG/ML: 200 INJECTION INTRAMUSCULAR at 00:00

## 2022-07-19 ENCOUNTER — APPOINTMENT (OUTPATIENT)
Dept: ENDOCRINOLOGY | Facility: CLINIC | Age: 47
End: 2022-07-19

## 2022-07-19 PROCEDURE — 96372 THER/PROPH/DIAG INJ SC/IM: CPT

## 2022-07-26 ENCOUNTER — APPOINTMENT (OUTPATIENT)
Dept: ENDOCRINOLOGY | Facility: CLINIC | Age: 47
End: 2022-07-26

## 2022-07-26 PROCEDURE — 96372 THER/PROPH/DIAG INJ SC/IM: CPT

## 2022-07-26 RX ORDER — TESTOSTERONE CYPIONATE 200 MG/ML
200 INJECTION, SOLUTION INTRAMUSCULAR
Refills: 0 | Status: COMPLETED | OUTPATIENT
Start: 2022-07-26

## 2022-07-26 RX ADMIN — TESTOSTERONE CYPIONATE 0 MG/ML: 200 INJECTION INTRAMUSCULAR at 00:00

## 2022-08-02 ENCOUNTER — APPOINTMENT (OUTPATIENT)
Dept: ENDOCRINOLOGY | Facility: CLINIC | Age: 47
End: 2022-08-02

## 2022-08-02 PROCEDURE — 96372 THER/PROPH/DIAG INJ SC/IM: CPT

## 2022-08-02 RX ORDER — TESTOSTERONE CYPIONATE 200 MG/ML
200 INJECTION, SOLUTION INTRAMUSCULAR
Refills: 0 | Status: COMPLETED | OUTPATIENT
Start: 2022-08-02

## 2022-08-02 RX ADMIN — TESTOSTERONE CYPIONATE 0 MG/ML: 200 INJECTION INTRAMUSCULAR at 00:00

## 2022-08-09 ENCOUNTER — APPOINTMENT (OUTPATIENT)
Dept: ENDOCRINOLOGY | Facility: CLINIC | Age: 47
End: 2022-08-09

## 2022-08-09 PROCEDURE — 96372 THER/PROPH/DIAG INJ SC/IM: CPT

## 2022-08-09 RX ORDER — TESTOSTERONE CYPIONATE 200 MG/ML
200 INJECTION, SOLUTION INTRAMUSCULAR
Refills: 0 | Status: COMPLETED | OUTPATIENT
Start: 2022-08-09

## 2022-08-11 ENCOUNTER — TRANSCRIPTION ENCOUNTER (OUTPATIENT)
Age: 47
End: 2022-08-11

## 2022-08-16 ENCOUNTER — APPOINTMENT (OUTPATIENT)
Dept: ENDOCRINOLOGY | Facility: CLINIC | Age: 47
End: 2022-08-16

## 2022-08-16 PROCEDURE — 96372 THER/PROPH/DIAG INJ SC/IM: CPT

## 2022-08-16 RX ORDER — TESTOSTERONE CYPIONATE 200 MG/ML
200 INJECTION, SOLUTION INTRAMUSCULAR
Refills: 0 | Status: COMPLETED | OUTPATIENT
Start: 2022-08-16

## 2022-08-16 RX ADMIN — TESTOSTERONE CYPIONATE 0 MG/ML: 200 INJECTION INTRAMUSCULAR at 00:00

## 2022-08-23 ENCOUNTER — APPOINTMENT (OUTPATIENT)
Dept: ENDOCRINOLOGY | Facility: CLINIC | Age: 47
End: 2022-08-23

## 2022-08-23 PROCEDURE — 96372 THER/PROPH/DIAG INJ SC/IM: CPT

## 2022-08-23 RX ORDER — TESTOSTERONE CYPIONATE 200 MG/ML
200 INJECTION, SOLUTION INTRAMUSCULAR
Refills: 0 | Status: COMPLETED | OUTPATIENT
Start: 2022-08-23

## 2022-08-23 RX ADMIN — TESTOSTERONE CYPIONATE 0 MG/ML: 200 INJECTION INTRAMUSCULAR at 00:00

## 2022-08-30 ENCOUNTER — APPOINTMENT (OUTPATIENT)
Dept: ENDOCRINOLOGY | Facility: CLINIC | Age: 47
End: 2022-08-30

## 2022-08-30 PROCEDURE — 96372 THER/PROPH/DIAG INJ SC/IM: CPT

## 2022-08-30 RX ORDER — TESTOSTERONE CYPIONATE 200 MG/ML
200 INJECTION, SOLUTION INTRAMUSCULAR
Refills: 0 | Status: COMPLETED | OUTPATIENT
Start: 2022-08-30

## 2022-08-30 RX ADMIN — TESTOSTERONE CYPIONATE 0 MG/ML: 200 INJECTION INTRAMUSCULAR at 00:00

## 2022-09-01 ENCOUNTER — TRANSCRIPTION ENCOUNTER (OUTPATIENT)
Age: 47
End: 2022-09-01

## 2022-09-06 ENCOUNTER — APPOINTMENT (OUTPATIENT)
Dept: ENDOCRINOLOGY | Facility: CLINIC | Age: 47
End: 2022-09-06

## 2022-09-06 PROCEDURE — 96372 THER/PROPH/DIAG INJ SC/IM: CPT

## 2022-09-06 RX ORDER — TESTOSTERONE CYPIONATE 200 MG/ML
200 INJECTION, SOLUTION INTRAMUSCULAR
Refills: 0 | Status: COMPLETED | OUTPATIENT
Start: 2022-09-06

## 2022-09-06 RX ADMIN — TESTOSTERONE CYPIONATE 0 MG/ML: 200 INJECTION INTRAMUSCULAR at 00:00

## 2022-09-08 ENCOUNTER — TRANSCRIPTION ENCOUNTER (OUTPATIENT)
Age: 47
End: 2022-09-08

## 2022-09-13 ENCOUNTER — APPOINTMENT (OUTPATIENT)
Dept: ENDOCRINOLOGY | Facility: CLINIC | Age: 47
End: 2022-09-13

## 2022-09-13 PROCEDURE — 96372 THER/PROPH/DIAG INJ SC/IM: CPT

## 2022-09-13 RX ADMIN — TESTOSTERONE CYPIONATE 0.25 MG/ML: 200 INJECTION INTRAMUSCULAR at 00:00

## 2022-09-14 RX ORDER — TESTOSTERONE CYPIONATE 200 MG/ML
200 INJECTION, SOLUTION INTRAMUSCULAR
Refills: 0 | Status: COMPLETED | OUTPATIENT
Start: 2022-09-13

## 2022-09-20 ENCOUNTER — APPOINTMENT (OUTPATIENT)
Dept: ENDOCRINOLOGY | Facility: CLINIC | Age: 47
End: 2022-09-20

## 2022-09-20 LAB
HBA1C MFR BLD HPLC: 8.8
LDLC SERPL DIRECT ASSAY-MCNC: 77
MICROALBUMIN/CREAT 24H UR-RTO: 5

## 2022-09-20 PROCEDURE — 96372 THER/PROPH/DIAG INJ SC/IM: CPT

## 2022-09-20 RX ORDER — TESTOSTERONE CYPIONATE 200 MG/ML
200 INJECTION, SOLUTION INTRAMUSCULAR
Refills: 0 | Status: COMPLETED | OUTPATIENT
Start: 2022-09-20

## 2022-09-20 RX ADMIN — TESTOSTERONE CYPIONATE 0 MG/ML: 200 INJECTION INTRAMUSCULAR at 00:00

## 2022-09-21 ENCOUNTER — APPOINTMENT (OUTPATIENT)
Dept: ENDOCRINOLOGY | Facility: CLINIC | Age: 47
End: 2022-09-21

## 2022-09-21 VITALS
DIASTOLIC BLOOD PRESSURE: 80 MMHG | SYSTOLIC BLOOD PRESSURE: 120 MMHG | BODY MASS INDEX: 32.06 KG/M2 | HEIGHT: 71 IN | OXYGEN SATURATION: 98 % | WEIGHT: 229 LBS | HEART RATE: 87 BPM

## 2022-09-21 LAB — GLUCOSE BLDC GLUCOMTR-MCNC: 305

## 2022-09-21 PROCEDURE — 82962 GLUCOSE BLOOD TEST: CPT

## 2022-09-21 PROCEDURE — 99214 OFFICE O/P EST MOD 30 MIN: CPT | Mod: 25

## 2022-09-21 NOTE — ASSESSMENT
[FreeTextEntry1] : T2DM\par -HGA1C worse bc pt stopped taking medications due to depression/sickness\par -Continue Glimepriride to 2 mg BID\par -Continue Jardiance 25 mg daily\par -Continue MFN 1000 mg BID\par -Continue Alogliptin 25 mg daily\par -Continue checking BS 2-3x a day. Send logs in every 2 weeks. If blood sugars begin to trend low, pt will call office for med changes\par -Must watch diet, no potato bread!\par -Increase exercise as tolerated, goal of 30 mins a day 5x a day\par -Continue to follow up with all specialists including ophtho, podiatry, neurology \par \par \par HTN\par -Will continue to monitor, likely will need aceI for kidney protection \par \par \par HLD\par -Continue Gemfibrozil and statin, do not stop taking medications \par -Increase exercise efforts\par \par Low Testosterone \par -Free testosterone and estradiol elevated, but no changes to testosterone dose appropriate at this time\par -Continue 50 mg weekly of testosterone injection\par \par Follow with PCP for tic panel\par Pt to let me know when he finds a therapist/mental health provider\par \par Urine culture done today in office to rule out UTI- will call with jacklyn \par Fasting labs needed 3 months\par RTO 3 months

## 2022-09-21 NOTE — PHYSICAL EXAM

## 2022-09-21 NOTE — HISTORY OF PRESENT ILLNESS
[FreeTextEntry1] : Pt still trying to find a therapist he feels he will get along with and takes his insurance.\par Pt very depressed. \par Pt has periods of depression where he "gives up" and stops taking diabetic medications.\par \par Actively looking for work but has been turned down from 63 jobs.\par \par T2DM\par Severity: poor control\par Onset: routine \par Duration: approx 4-5 years\par Modifying Factors: never has used insulin before\par \par SMBG\par Checks FS 2x a day\par FS this \par Fs in office 305- pbj on potato bread and coffee \par \par HGA1C 8/2022-8.8 (period of 4 weeks off all meds that reflect this HGA1C)\par \par Current drug regimen- back on meds for 3 weeks\par MFN 1000 MG BID\par Jaridance 25 mg daily\par Alogliptin 25 mg daily\par Glimepiride 2 mg BID\par \par Could not tolerate Victoza due to extreme N/V\par \par Eye exam: dend[facility] ARAN[facility]- [place] for 10/2022\par Foot exam: Saw podiatry- +severe neuropathy, on gabapentin 1200 mg TID\par Kidney disease: denies \par Heart disease: denies but FH, father with CAD\par \par Weight: stable\par Diet:-eating less bread \par Drink- water \par Exercise: has not been exercising , doing a lot of yard work\par Smoking: a pack a day smoker \par \par HLD\par Gemfibrizol 600 mg daily\par Atorvastatin 20 mg daily \par FH of high triglycerides\par Had period of 4 weeks where he took no medications leading up to blood work\par Total cholesterol 180, HDL 23, LDL 77, Triglycerides 499\par Strong FH of MI/heart disease\par \par HTN\par BP in office 120/80\par On no medications\par \par Low Testosterone \par Approx 9 years\par Has an 18 year old son\par On 50 mg of testosterone every week\par Had brain MRI 2016 which was negative for pituitary adenoma \par FH : Dad on androgel and felt very angry \par Total testosterone 653\par \par Low Vitamin D\par 50,000 IU supplement weekly\par On labs 34.6\par \par Pt has found multiple ticks on him lately, did have some abnormals on tick panel\par Pt has apt with new PCP tomorrow

## 2022-09-21 NOTE — REVIEW OF SYSTEMS
[Fatigue] : fatigue [Recent Weight Gain (___ Lbs)] : no recent weight gain [Recent Weight Loss (___ Lbs)] : no recent weight loss [Visual Field Defect] : no visual field defect [Blurred Vision] : no blurred vision [Dysphagia] : no dysphagia [Neck Pain] : no neck pain [Dysphonia] : no dysphonia [Chest Pain] : no chest pain [Shortness Of Breath] : no shortness of breath [Constipation] : no constipation [Diarrhea] : no diarrhea [Gas/Bloating] : gas/bloating [Dysuria] : dysuria [Hesistancy] : hesitancy [Back Pain] : back pain [Polydipsia] : no polydipsia [FreeTextEntry8] : +urgency [FreeTextEntry9] : knee pain, +neuropathy

## 2022-09-22 ENCOUNTER — TRANSCRIPTION ENCOUNTER (OUTPATIENT)
Age: 47
End: 2022-09-22

## 2022-09-22 LAB
APPEARANCE: CLEAR
BILIRUBIN URINE: NEGATIVE
BLOOD URINE: NEGATIVE
COLOR: NORMAL
GLUCOSE QUALITATIVE U: ABNORMAL
KETONES URINE: NEGATIVE
LEUKOCYTE ESTERASE URINE: NEGATIVE
NITRITE URINE: NEGATIVE
PH URINE: 6.5
PROTEIN URINE: NEGATIVE
SPECIFIC GRAVITY URINE: 1.03
UROBILINOGEN URINE: NORMAL

## 2022-09-27 ENCOUNTER — APPOINTMENT (OUTPATIENT)
Dept: ENDOCRINOLOGY | Facility: CLINIC | Age: 47
End: 2022-09-27

## 2022-09-27 PROCEDURE — 96372 THER/PROPH/DIAG INJ SC/IM: CPT

## 2022-09-27 RX ORDER — TESTOSTERONE CYPIONATE 200 MG/ML
200 INJECTION, SOLUTION INTRAMUSCULAR
Refills: 0 | Status: COMPLETED | OUTPATIENT
Start: 2022-09-27

## 2022-09-27 RX ADMIN — TESTOSTERONE CYPIONATE 0 MG/ML: 200 INJECTION INTRAMUSCULAR at 00:00

## 2022-10-04 ENCOUNTER — APPOINTMENT (OUTPATIENT)
Dept: ENDOCRINOLOGY | Facility: CLINIC | Age: 47
End: 2022-10-04

## 2022-10-04 PROCEDURE — 96372 THER/PROPH/DIAG INJ SC/IM: CPT

## 2022-10-04 RX ORDER — TESTOSTERONE CYPIONATE 200 MG/ML
200 INJECTION, SOLUTION INTRAMUSCULAR
Refills: 0 | Status: COMPLETED | OUTPATIENT
Start: 2022-10-04

## 2022-10-11 ENCOUNTER — APPOINTMENT (OUTPATIENT)
Dept: ENDOCRINOLOGY | Facility: CLINIC | Age: 47
End: 2022-10-11

## 2022-10-11 ENCOUNTER — NON-APPOINTMENT (OUTPATIENT)
Age: 47
End: 2022-10-11

## 2022-10-11 PROCEDURE — 96372 THER/PROPH/DIAG INJ SC/IM: CPT

## 2022-10-11 RX ORDER — TESTOSTERONE CYPIONATE 200 MG/ML
200 INJECTION, SOLUTION INTRAMUSCULAR
Refills: 0 | Status: COMPLETED | OUTPATIENT
Start: 2022-10-11

## 2022-10-18 ENCOUNTER — APPOINTMENT (OUTPATIENT)
Dept: ENDOCRINOLOGY | Facility: CLINIC | Age: 47
End: 2022-10-18

## 2022-10-18 PROCEDURE — 96372 THER/PROPH/DIAG INJ SC/IM: CPT

## 2022-10-18 RX ORDER — TESTOSTERONE CYPIONATE 200 MG/ML
200 INJECTION, SOLUTION INTRAMUSCULAR
Refills: 0 | Status: COMPLETED | OUTPATIENT
Start: 2022-10-18

## 2022-10-18 RX ADMIN — TESTOSTERONE CYPIONATE 0 MG/ML: 200 INJECTION INTRAMUSCULAR at 00:00

## 2022-10-25 ENCOUNTER — APPOINTMENT (OUTPATIENT)
Dept: ENDOCRINOLOGY | Facility: CLINIC | Age: 47
End: 2022-10-25

## 2022-10-25 PROCEDURE — 96372 THER/PROPH/DIAG INJ SC/IM: CPT

## 2022-10-25 RX ORDER — TESTOSTERONE CYPIONATE 200 MG/ML
200 INJECTION, SOLUTION INTRAMUSCULAR
Refills: 0 | Status: COMPLETED | OUTPATIENT
Start: 2022-10-25

## 2022-10-25 RX ADMIN — TESTOSTERONE CYPIONATE 0 MG/ML: 200 INJECTION INTRAMUSCULAR at 00:00

## 2022-11-01 ENCOUNTER — APPOINTMENT (OUTPATIENT)
Dept: ENDOCRINOLOGY | Facility: CLINIC | Age: 47
End: 2022-11-01

## 2022-11-01 PROCEDURE — 96372 THER/PROPH/DIAG INJ SC/IM: CPT

## 2022-11-01 RX ORDER — TESTOSTERONE CYPIONATE 200 MG/ML
200 INJECTION, SOLUTION INTRAMUSCULAR
Refills: 0 | Status: COMPLETED | OUTPATIENT
Start: 2022-11-01

## 2022-11-01 RX ADMIN — TESTOSTERONE CYPIONATE 0 MG/ML: 200 INJECTION INTRAMUSCULAR at 00:00

## 2022-11-08 ENCOUNTER — APPOINTMENT (OUTPATIENT)
Dept: ENDOCRINOLOGY | Facility: CLINIC | Age: 47
End: 2022-11-08

## 2022-11-08 PROCEDURE — 96372 THER/PROPH/DIAG INJ SC/IM: CPT

## 2022-11-08 RX ORDER — TESTOSTERONE CYPIONATE 200 MG/ML
200 INJECTION, SOLUTION INTRAMUSCULAR
Refills: 0 | Status: COMPLETED | OUTPATIENT
Start: 2022-11-08

## 2022-11-15 ENCOUNTER — APPOINTMENT (OUTPATIENT)
Dept: ENDOCRINOLOGY | Facility: CLINIC | Age: 47
End: 2022-11-15

## 2022-11-15 PROCEDURE — 96372 THER/PROPH/DIAG INJ SC/IM: CPT

## 2022-11-15 RX ORDER — TESTOSTERONE CYPIONATE 200 MG/ML
200 INJECTION, SOLUTION INTRAMUSCULAR
Refills: 0 | Status: COMPLETED | OUTPATIENT
Start: 2022-11-15

## 2022-11-15 RX ADMIN — TESTOSTERONE CYPIONATE 0 MG/ML: 200 INJECTION INTRAMUSCULAR at 00:00

## 2022-11-22 ENCOUNTER — APPOINTMENT (OUTPATIENT)
Dept: ENDOCRINOLOGY | Facility: CLINIC | Age: 47
End: 2022-11-22

## 2022-11-22 PROCEDURE — 96372 THER/PROPH/DIAG INJ SC/IM: CPT

## 2022-11-22 RX ORDER — TESTOSTERONE CYPIONATE 200 MG/ML
200 INJECTION, SOLUTION INTRAMUSCULAR
Qty: 0 | Refills: 0 | Status: COMPLETED | OUTPATIENT
Start: 2022-11-22

## 2022-11-29 ENCOUNTER — APPOINTMENT (OUTPATIENT)
Dept: ENDOCRINOLOGY | Facility: CLINIC | Age: 47
End: 2022-11-29

## 2022-11-29 PROCEDURE — 96372 THER/PROPH/DIAG INJ SC/IM: CPT

## 2022-11-29 RX ORDER — TESTOSTERONE CYPIONATE 200 MG/ML
200 INJECTION, SOLUTION INTRAMUSCULAR
Refills: 0 | Status: COMPLETED | OUTPATIENT
Start: 2022-11-29

## 2022-11-29 RX ADMIN — TESTOSTERONE CYPIONATE 0 MG/ML: 200 INJECTION INTRAMUSCULAR at 00:00

## 2022-12-06 ENCOUNTER — APPOINTMENT (OUTPATIENT)
Dept: ENDOCRINOLOGY | Facility: CLINIC | Age: 47
End: 2022-12-06

## 2022-12-06 PROCEDURE — 96372 THER/PROPH/DIAG INJ SC/IM: CPT

## 2022-12-06 RX ORDER — TESTOSTERONE CYPIONATE 200 MG/ML
200 INJECTION, SOLUTION INTRAMUSCULAR
Refills: 0 | Status: COMPLETED | OUTPATIENT
Start: 2022-12-06

## 2022-12-06 RX ADMIN — TESTOSTERONE CYPIONATE 0 MG/ML: 200 INJECTION INTRAMUSCULAR at 00:00

## 2022-12-13 ENCOUNTER — APPOINTMENT (OUTPATIENT)
Dept: ENDOCRINOLOGY | Facility: CLINIC | Age: 47
End: 2022-12-13

## 2022-12-13 PROCEDURE — 96372 THER/PROPH/DIAG INJ SC/IM: CPT

## 2022-12-13 RX ORDER — TESTOSTERONE CYPIONATE 200 MG/ML
200 INJECTION, SOLUTION INTRAMUSCULAR
Refills: 0 | Status: COMPLETED | OUTPATIENT
Start: 2022-12-13

## 2022-12-13 RX ADMIN — TESTOSTERONE CYPIONATE 0 MG/ML: 200 INJECTION INTRAMUSCULAR at 00:00

## 2022-12-20 ENCOUNTER — APPOINTMENT (OUTPATIENT)
Dept: ENDOCRINOLOGY | Facility: CLINIC | Age: 47
End: 2022-12-20

## 2022-12-20 PROCEDURE — 96372 THER/PROPH/DIAG INJ SC/IM: CPT

## 2022-12-20 RX ORDER — TESTOSTERONE CYPIONATE 200 MG/ML
200 INJECTION, SOLUTION INTRAMUSCULAR
Refills: 0 | Status: COMPLETED | OUTPATIENT
Start: 2022-12-20

## 2022-12-20 RX ADMIN — TESTOSTERONE CYPIONATE 0 MG/ML: 200 INJECTION INTRAMUSCULAR at 00:00

## 2022-12-27 ENCOUNTER — APPOINTMENT (OUTPATIENT)
Dept: ENDOCRINOLOGY | Facility: CLINIC | Age: 47
End: 2022-12-27

## 2022-12-27 PROCEDURE — 96372 THER/PROPH/DIAG INJ SC/IM: CPT

## 2022-12-27 RX ORDER — TESTOSTERONE CYPIONATE 200 MG/ML
200 INJECTION, SOLUTION INTRAMUSCULAR
Refills: 0 | Status: COMPLETED | OUTPATIENT
Start: 2022-12-27

## 2022-12-27 RX ADMIN — TESTOSTERONE CYPIONATE 0 MG/ML: 200 INJECTION INTRAMUSCULAR at 00:00

## 2023-01-04 ENCOUNTER — APPOINTMENT (OUTPATIENT)
Dept: ENDOCRINOLOGY | Facility: CLINIC | Age: 48
End: 2023-01-04
Payer: MEDICAID

## 2023-01-04 VITALS
HEART RATE: 82 BPM | BODY MASS INDEX: 31.5 KG/M2 | HEIGHT: 71 IN | SYSTOLIC BLOOD PRESSURE: 130 MMHG | OXYGEN SATURATION: 98 % | DIASTOLIC BLOOD PRESSURE: 80 MMHG | WEIGHT: 225 LBS

## 2023-01-04 LAB — GLUCOSE BLDC GLUCOMTR-MCNC: 446

## 2023-01-04 PROCEDURE — 82962 GLUCOSE BLOOD TEST: CPT

## 2023-01-04 PROCEDURE — 99214 OFFICE O/P EST MOD 30 MIN: CPT | Mod: 25

## 2023-01-04 PROCEDURE — 96372 THER/PROPH/DIAG INJ SC/IM: CPT

## 2023-01-04 RX ORDER — TESTOSTERONE CYPIONATE 200 MG/ML
200 INJECTION, SOLUTION INTRAMUSCULAR
Refills: 0 | Status: COMPLETED | OUTPATIENT
Start: 2023-01-04

## 2023-01-04 RX ADMIN — TESTOSTERONE CYPIONATE 0 MG/ML: 200 INJECTION INTRAMUSCULAR at 00:00

## 2023-01-04 NOTE — PHYSICAL EXAM

## 2023-01-04 NOTE — ASSESSMENT
[FreeTextEntry1] : T2DM\par -Need updated HGA1C \par -Increase Glimepriride to 4 mg BID\par -Continue Jardiance 25 mg daily\par -Continue MFN 1000 mg BID\par -Continue Alogliptin 25 mg daily\par -Continue checking BS 2-3x a day. Send logs in every 2 weeks. If blood sugars begin to trend low, pt will call office for med changes\par -Must watch diet, no potato bread!\par -Increase exercise as tolerated, goal of 30 mins a day 5x a day\par -Continue to follow up with all specialists including ophtho, podiatry, neurology \par \par \par HTN\par -Will continue to monitor, likely will need aceI for kidney protection \par \par \par HLD\par -Continue Gemfibrozil and statin, do not stop taking medications \par -Increase exercise efforts\par \par Low Testosterone \par -Free testosterone and estradiol elevated, but no changes to testosterone dose appropriate at this time\par -Continue 50 mg weekly of testosterone injection\par \par PSA profile drawn in office, also using [facility] to find a urologist\par Pt to let me know when he finds a therapist/mental health provider\par \par Fasting labs needed now, mid cycle of testosterone\par RTO 3 months

## 2023-01-04 NOTE — REVIEW OF SYSTEMS
[Fatigue] : fatigue [Polyuria] : polyuria [Nocturia] : nocturia [Hesistancy] : hesitancy [Dry Skin] : dry skin [Polydipsia] : polydipsia [Recent Weight Gain (___ Lbs)] : no recent weight gain [Recent Weight Loss (___ Lbs)] : no recent weight loss [Visual Field Defect] : no visual field defect [Blurred Vision] : no blurred vision [Dysphagia] : no dysphagia [Neck Pain] : no neck pain [Dysphonia] : no dysphonia [Chest Pain] : no chest pain [Shortness Of Breath] : no shortness of breath [Constipation] : no constipation [Diarrhea] : no diarrhea

## 2023-01-04 NOTE — HISTORY OF PRESENT ILLNESS
[FreeTextEntry1] : Pt still trying to find a therapist he feels he will get along with and takes his insurance.\par Pt very depressed. \par Pt has periods of depression where he "gives up" and stops taking diabetic medications.\par \par Actively looking for work but has been turned down from 63 jobs.\par \par T2DM\par Severity: poor control\par Onset: routine \par Duration: approx 4-5 years\par Modifying Factors: never has used insulin before\par \par SMBG\par Checks FS 2x a day\par FS this AM 150s\par Post dinner usually 250s, 300s\par Fs in office 446- cheerios and coffee\par \par Need updated HGA1C\par \par Current drug regimen\par MFN 1000 MG BID\par Jaridance 25 mg daily (forgets sometimes) \par Alogliptin 25 mg daily\par Glimepiride 2 mg BID\par \par Could not tolerate Victoza due to extreme N/V\par \par Eye exam: karen MONTOYA 10/2022\par Foot exam: Saw podiatry- +severe neuropathy, on gabapentin 1200 mg TID\par Kidney disease: denies \par Heart disease: denies but FH, father with CAD\par \par Weight: stable\par Diet:-eating less bread \par Drink- water \par Exercise: tries to lift weights when he can, nothing consistent \par Smoking: a pack a day smoker \par \par HLD\par Gemfibrizol 600 mg daily\par Atorvastatin 20 mg daily \par FH of high triglycerides\par Had period of 4 weeks where he took no medications leading up to blood work\par Need updated lipid panel \par Strong FH of MI/heart disease\par \par HTN\par BP in office 130/80\par On no medications\par \par Low Testosterone \par Approx 9 years\par Has an 18 year old son\par On 50 mg of testosterone every week\par Had brain MRI 2016 which was negative for pituitary adenoma \par FH : Dad on androgel and felt very angry \par Total testosterone 653\par \par Low Vitamin D\par 50,000 IU supplement weekly\par Need updated levels with labs \par \par Pt feels a lot of discomfort in prostate area\par Pt feels very dry today

## 2023-01-11 ENCOUNTER — APPOINTMENT (OUTPATIENT)
Dept: ENDOCRINOLOGY | Facility: CLINIC | Age: 48
End: 2023-01-11
Payer: MEDICAID

## 2023-01-11 PROCEDURE — 96372 THER/PROPH/DIAG INJ SC/IM: CPT

## 2023-01-11 RX ORDER — TESTOSTERONE CYPIONATE 200 MG/ML
200 INJECTION, SOLUTION INTRAMUSCULAR
Refills: 0 | Status: COMPLETED | OUTPATIENT
Start: 2023-01-11

## 2023-01-11 RX ADMIN — TESTOSTERONE CYPIONATE 0 MG/ML: 200 INJECTION INTRAMUSCULAR at 00:00

## 2023-01-18 ENCOUNTER — APPOINTMENT (OUTPATIENT)
Dept: ENDOCRINOLOGY | Facility: CLINIC | Age: 48
End: 2023-01-18
Payer: MEDICAID

## 2023-01-18 PROCEDURE — 96372 THER/PROPH/DIAG INJ SC/IM: CPT

## 2023-01-18 RX ORDER — TESTOSTERONE CYPIONATE 200 MG/ML
200 INJECTION, SOLUTION INTRAMUSCULAR
Refills: 0 | Status: COMPLETED | OUTPATIENT
Start: 2023-01-18

## 2023-01-18 RX ADMIN — TESTOSTERONE CYPIONATE 0 MG/ML: 200 INJECTION INTRAMUSCULAR at 00:00

## 2023-01-25 ENCOUNTER — APPOINTMENT (OUTPATIENT)
Dept: ENDOCRINOLOGY | Facility: CLINIC | Age: 48
End: 2023-01-25
Payer: MEDICAID

## 2023-01-25 PROCEDURE — 96372 THER/PROPH/DIAG INJ SC/IM: CPT

## 2023-01-25 RX ORDER — TESTOSTERONE CYPIONATE 200 MG/ML
200 INJECTION, SOLUTION INTRAMUSCULAR
Refills: 0 | Status: COMPLETED | OUTPATIENT
Start: 2023-01-25

## 2023-01-25 RX ADMIN — TESTOSTERONE CYPIONATE 0 MG/ML: 200 INJECTION INTRAMUSCULAR at 00:00

## 2023-02-01 ENCOUNTER — APPOINTMENT (OUTPATIENT)
Dept: ENDOCRINOLOGY | Facility: CLINIC | Age: 48
End: 2023-02-01
Payer: MEDICAID

## 2023-02-01 PROCEDURE — 96372 THER/PROPH/DIAG INJ SC/IM: CPT

## 2023-02-01 RX ORDER — TESTOSTERONE CYPIONATE 200 MG/ML
200 INJECTION, SOLUTION INTRAMUSCULAR
Refills: 0 | Status: COMPLETED | OUTPATIENT
Start: 2023-02-01

## 2023-02-01 RX ADMIN — TESTOSTERONE CYPIONATE 0 MG/ML: 200 INJECTION INTRAMUSCULAR at 00:00

## 2023-02-08 ENCOUNTER — APPOINTMENT (OUTPATIENT)
Dept: ENDOCRINOLOGY | Facility: CLINIC | Age: 48
End: 2023-02-08
Payer: MEDICAID

## 2023-02-08 PROCEDURE — 96372 THER/PROPH/DIAG INJ SC/IM: CPT

## 2023-02-08 RX ORDER — TESTOSTERONE CYPIONATE 200 MG/ML
200 INJECTION, SOLUTION INTRAMUSCULAR
Refills: 0 | Status: COMPLETED | OUTPATIENT
Start: 2023-02-08

## 2023-02-08 RX ADMIN — TESTOSTERONE CYPIONATE 0 MG/ML: 200 INJECTION INTRAMUSCULAR at 00:00

## 2023-02-15 ENCOUNTER — APPOINTMENT (OUTPATIENT)
Dept: ENDOCRINOLOGY | Facility: CLINIC | Age: 48
End: 2023-02-15
Payer: MEDICAID

## 2023-02-15 PROCEDURE — 96372 THER/PROPH/DIAG INJ SC/IM: CPT

## 2023-02-15 RX ORDER — TESTOSTERONE CYPIONATE 200 MG/ML
200 INJECTION, SOLUTION INTRAMUSCULAR
Refills: 0 | Status: COMPLETED | OUTPATIENT
Start: 2023-02-15

## 2023-02-21 ENCOUNTER — TRANSCRIPTION ENCOUNTER (OUTPATIENT)
Age: 48
End: 2023-02-21

## 2023-02-22 ENCOUNTER — APPOINTMENT (OUTPATIENT)
Dept: ENDOCRINOLOGY | Facility: CLINIC | Age: 48
End: 2023-02-22
Payer: MEDICAID

## 2023-02-22 PROCEDURE — 96372 THER/PROPH/DIAG INJ SC/IM: CPT

## 2023-02-22 RX ORDER — TESTOSTERONE CYPIONATE 200 MG/ML
200 INJECTION, SOLUTION INTRAMUSCULAR
Refills: 0 | Status: COMPLETED | OUTPATIENT
Start: 2023-02-22

## 2023-02-22 RX ADMIN — TESTOSTERONE CYPIONATE 0 MG/ML: 200 INJECTION INTRAMUSCULAR at 00:00

## 2023-03-01 ENCOUNTER — APPOINTMENT (OUTPATIENT)
Dept: ENDOCRINOLOGY | Facility: CLINIC | Age: 48
End: 2023-03-01
Payer: MEDICAID

## 2023-03-01 PROCEDURE — 96372 THER/PROPH/DIAG INJ SC/IM: CPT

## 2023-03-01 RX ORDER — TESTOSTERONE CYPIONATE 200 MG/ML
200 INJECTION, SOLUTION INTRAMUSCULAR
Refills: 0 | Status: COMPLETED | OUTPATIENT
Start: 2023-03-01

## 2023-03-01 RX ADMIN — TESTOSTERONE CYPIONATE 0 MG/ML: 200 INJECTION INTRAMUSCULAR at 00:00

## 2023-03-02 ENCOUNTER — TRANSCRIPTION ENCOUNTER (OUTPATIENT)
Age: 48
End: 2023-03-02

## 2023-03-08 ENCOUNTER — APPOINTMENT (OUTPATIENT)
Dept: ENDOCRINOLOGY | Facility: CLINIC | Age: 48
End: 2023-03-08

## 2023-03-09 ENCOUNTER — TRANSCRIPTION ENCOUNTER (OUTPATIENT)
Age: 48
End: 2023-03-09

## 2023-03-15 ENCOUNTER — APPOINTMENT (OUTPATIENT)
Dept: ENDOCRINOLOGY | Facility: CLINIC | Age: 48
End: 2023-03-15
Payer: MEDICAID

## 2023-03-15 PROCEDURE — 96372 THER/PROPH/DIAG INJ SC/IM: CPT

## 2023-03-15 RX ORDER — TESTOSTERONE CYPIONATE 200 MG/ML
200 INJECTION, SOLUTION INTRAMUSCULAR
Refills: 0 | Status: COMPLETED | OUTPATIENT
Start: 2023-03-15

## 2023-03-22 ENCOUNTER — APPOINTMENT (OUTPATIENT)
Dept: ENDOCRINOLOGY | Facility: CLINIC | Age: 48
End: 2023-03-22
Payer: MEDICAID

## 2023-03-22 PROCEDURE — 96372 THER/PROPH/DIAG INJ SC/IM: CPT

## 2023-03-22 RX ORDER — TESTOSTERONE CYPIONATE 200 MG/ML
200 INJECTION, SOLUTION INTRAMUSCULAR
Refills: 0 | Status: COMPLETED | OUTPATIENT
Start: 2023-03-22

## 2023-03-22 RX ADMIN — TESTOSTERONE CYPIONATE 0 MG/ML: 200 INJECTION INTRAMUSCULAR at 00:00

## 2023-03-29 ENCOUNTER — APPOINTMENT (OUTPATIENT)
Dept: ENDOCRINOLOGY | Facility: CLINIC | Age: 48
End: 2023-03-29
Payer: MEDICAID

## 2023-03-29 PROCEDURE — 96372 THER/PROPH/DIAG INJ SC/IM: CPT

## 2023-03-29 RX ORDER — TESTOSTERONE CYPIONATE 200 MG/ML
200 INJECTION, SOLUTION INTRAMUSCULAR
Refills: 0 | Status: COMPLETED | OUTPATIENT
Start: 2023-03-29

## 2023-03-29 RX ADMIN — TESTOSTERONE CYPIONATE 0 MG/ML: 200 INJECTION INTRAMUSCULAR at 00:00

## 2023-04-04 LAB
HBA1C MFR BLD HPLC: 9.2
LDLC SERPL DIRECT ASSAY-MCNC: 93
MICROALBUMIN/CREAT 24H UR-RTO: 5
TSH SERPL-ACNC: 1.3

## 2023-04-05 ENCOUNTER — APPOINTMENT (OUTPATIENT)
Dept: ENDOCRINOLOGY | Facility: CLINIC | Age: 48
End: 2023-04-05
Payer: MEDICAID

## 2023-04-05 VITALS
BODY MASS INDEX: 32.34 KG/M2 | WEIGHT: 231 LBS | DIASTOLIC BLOOD PRESSURE: 78 MMHG | HEART RATE: 81 BPM | SYSTOLIC BLOOD PRESSURE: 120 MMHG | OXYGEN SATURATION: 97 % | HEIGHT: 71 IN

## 2023-04-05 LAB — GLUCOSE BLDC GLUCOMTR-MCNC: 159

## 2023-04-05 PROCEDURE — ZZZZZ: CPT

## 2023-04-05 PROCEDURE — 99214 OFFICE O/P EST MOD 30 MIN: CPT | Mod: 25

## 2023-04-05 PROCEDURE — 96372 THER/PROPH/DIAG INJ SC/IM: CPT

## 2023-04-05 PROCEDURE — 82962 GLUCOSE BLOOD TEST: CPT

## 2023-04-05 RX ORDER — TESTOSTERONE CYPIONATE 200 MG/ML
200 INJECTION, SOLUTION INTRAMUSCULAR
Refills: 0 | Status: COMPLETED | OUTPATIENT
Start: 2023-04-05

## 2023-04-05 RX ADMIN — TESTOSTERONE CYPIONATE 0 MG/ML: 200 INJECTION INTRAMUSCULAR at 00:00

## 2023-04-05 NOTE — HISTORY OF PRESENT ILLNESS
[FreeTextEntry1] : Pt still trying to find a therapist he feels he will get along with and takes his insurance.\par Pt very depressed. \par Pt has periods of depression where he "gives up" and stops taking diabetic medications.\par \par Actively looking for work but has been turned down from 63 jobs.\par \par T2DM\par Severity: poor control\par Onset: routine \par Duration: approx 4-5 years\par Modifying Factors: never has used insulin before\par \par SMBG\par Checks FS 2x a day\par FS on average 100s, rare 200s \par \par Fs in office 159\par \par HGA1C- 2/2023 9.2\par \par Current drug regimen\par MFN 1000 MG BID\par Jaridance 25 mg daily\par Alogliptin 25 mg daily\par Glimepiride 4 mg BID\par \par Could not tolerate Victoza due to extreme N/V\par \par Eye exam: denies DR- LV 12/2022\par Foot exam: Saw podiatry- +severe neuropathy, on gabapentin 1200 mg TID\par Kidney disease: denies \par Heart disease: denies but FH, father with CAD\par \par Weight: stable\par Diet:-eating less bread \par Drink- water \par Exercise: tries to lift weights when he can, nothing consistent \par Smoking: a pack a day smoker \par \par HLD\par Gemfibrizol 600 mg daily- has been out for 2 days\par Atorvastatin 20 mg daily \par FH of high triglycerides\par Triglcerides 420, Total cholesterol 167, HDL 24, LDL 79\par Strong FH of MI/heart disease\par \par HTN\par BP in office 120/78\par On no medications\par \par Low Testosterone \par Approx 9 years\par Has an 18 year old son\par On 50 mg of testosterone every week\par Had brain MRI 2016 which was negative for pituitary adenoma \par FH : Dad on androgel and felt very angry \par Total testosterone 779\par \par Low Vitamin D\par 50,000 IU supplement weekly\par ON LABS 39.3\par

## 2023-04-05 NOTE — PHYSICAL EXAM

## 2023-04-05 NOTE — REVIEW OF SYSTEMS
[Fatigue] : fatigue [Polyuria] : polyuria [Dysuria] : dysuria [Depression] : depression [Stress] : stress [Recent Weight Gain (___ Lbs)] : no recent weight gain [Recent Weight Loss (___ Lbs)] : no recent weight loss [Visual Field Defect] : no visual field defect [Blurred Vision] : no blurred vision [Dysphagia] : no dysphagia [Neck Pain] : no neck pain [Dysphonia] : no dysphonia [Chest Pain] : no chest pain [Shortness Of Breath] : no shortness of breath [Constipation] : no constipation [Diarrhea] : no diarrhea [Polydipsia] : no polydipsia

## 2023-04-12 ENCOUNTER — APPOINTMENT (OUTPATIENT)
Dept: ENDOCRINOLOGY | Facility: CLINIC | Age: 48
End: 2023-04-12
Payer: MEDICAID

## 2023-04-12 PROCEDURE — 96372 THER/PROPH/DIAG INJ SC/IM: CPT

## 2023-04-12 RX ORDER — TESTOSTERONE CYPIONATE 200 MG/ML
200 INJECTION, SOLUTION INTRAMUSCULAR
Refills: 0 | Status: COMPLETED | OUTPATIENT
Start: 2023-04-12

## 2023-04-12 RX ADMIN — TESTOSTERONE CYPIONATE 0 MG/ML: 200 INJECTION INTRAMUSCULAR at 00:00

## 2023-04-19 ENCOUNTER — APPOINTMENT (OUTPATIENT)
Dept: ENDOCRINOLOGY | Facility: CLINIC | Age: 48
End: 2023-04-19
Payer: MEDICAID

## 2023-04-19 PROCEDURE — 96372 THER/PROPH/DIAG INJ SC/IM: CPT

## 2023-04-19 RX ORDER — TESTOSTERONE CYPIONATE 200 MG/ML
200 INJECTION, SOLUTION INTRAMUSCULAR
Refills: 0 | Status: COMPLETED | OUTPATIENT
Start: 2023-04-19

## 2023-04-19 RX ADMIN — TESTOSTERONE CYPIONATE 0 MG/ML: 200 INJECTION INTRAMUSCULAR at 00:00

## 2023-04-26 ENCOUNTER — APPOINTMENT (OUTPATIENT)
Dept: ENDOCRINOLOGY | Facility: CLINIC | Age: 48
End: 2023-04-26
Payer: MEDICAID

## 2023-04-26 PROCEDURE — 96372 THER/PROPH/DIAG INJ SC/IM: CPT

## 2023-04-26 RX ORDER — TESTOSTERONE CYPIONATE 200 MG/ML
200 INJECTION, SOLUTION INTRAMUSCULAR
Refills: 0 | Status: COMPLETED | OUTPATIENT
Start: 2023-04-26

## 2023-04-26 RX ADMIN — TESTOSTERONE CYPIONATE 0 MG/ML: 200 INJECTION INTRAMUSCULAR at 00:00

## 2023-05-03 ENCOUNTER — APPOINTMENT (OUTPATIENT)
Dept: ENDOCRINOLOGY | Facility: CLINIC | Age: 48
End: 2023-05-03
Payer: MEDICAID

## 2023-05-03 PROCEDURE — 96372 THER/PROPH/DIAG INJ SC/IM: CPT

## 2023-05-03 RX ORDER — TESTOSTERONE CYPIONATE 200 MG/ML
200 INJECTION, SOLUTION INTRAMUSCULAR
Refills: 0 | Status: COMPLETED | OUTPATIENT
Start: 2023-05-03

## 2023-05-03 RX ADMIN — TESTOSTERONE CYPIONATE 0 MG/ML: 200 INJECTION INTRAMUSCULAR at 00:00

## 2023-05-10 ENCOUNTER — APPOINTMENT (OUTPATIENT)
Dept: ENDOCRINOLOGY | Facility: CLINIC | Age: 48
End: 2023-05-10
Payer: MEDICAID

## 2023-05-10 PROCEDURE — 96372 THER/PROPH/DIAG INJ SC/IM: CPT

## 2023-05-10 RX ORDER — TESTOSTERONE CYPIONATE 200 MG/ML
200 INJECTION, SOLUTION INTRAMUSCULAR
Refills: 0 | Status: COMPLETED | OUTPATIENT
Start: 2023-05-10

## 2023-05-10 RX ADMIN — TESTOSTERONE CYPIONATE 0 MG/ML: 200 INJECTION INTRAMUSCULAR at 00:00

## 2023-05-17 ENCOUNTER — APPOINTMENT (OUTPATIENT)
Dept: ENDOCRINOLOGY | Facility: CLINIC | Age: 48
End: 2023-05-17
Payer: MEDICAID

## 2023-05-17 PROCEDURE — 96372 THER/PROPH/DIAG INJ SC/IM: CPT

## 2023-05-17 RX ORDER — TESTOSTERONE CYPIONATE 200 MG/ML
200 INJECTION, SOLUTION INTRAMUSCULAR
Refills: 0 | Status: COMPLETED | OUTPATIENT
Start: 2023-05-17

## 2023-05-17 RX ADMIN — TESTOSTERONE CYPIONATE 0 MG/ML: 200 INJECTION INTRAMUSCULAR at 00:00

## 2023-05-24 ENCOUNTER — APPOINTMENT (OUTPATIENT)
Dept: ENDOCRINOLOGY | Facility: CLINIC | Age: 48
End: 2023-05-24
Payer: MEDICAID

## 2023-05-24 PROCEDURE — 96372 THER/PROPH/DIAG INJ SC/IM: CPT

## 2023-05-24 RX ORDER — TESTOSTERONE CYPIONATE 200 MG/ML
200 INJECTION, SOLUTION INTRAMUSCULAR
Refills: 0 | Status: COMPLETED | OUTPATIENT
Start: 2023-05-24

## 2023-05-24 RX ADMIN — TESTOSTERONE CYPIONATE 0 MG/ML: 200 INJECTION INTRAMUSCULAR at 00:00

## 2023-05-25 ENCOUNTER — NON-APPOINTMENT (OUTPATIENT)
Age: 48
End: 2023-05-25

## 2023-05-25 ENCOUNTER — APPOINTMENT (OUTPATIENT)
Dept: FAMILY MEDICINE | Facility: CLINIC | Age: 48
End: 2023-05-25
Payer: MEDICAID

## 2023-05-25 VITALS
TEMPERATURE: 97.1 F | DIASTOLIC BLOOD PRESSURE: 88 MMHG | RESPIRATION RATE: 18 BRPM | OXYGEN SATURATION: 97 % | SYSTOLIC BLOOD PRESSURE: 142 MMHG | HEART RATE: 81 BPM | HEIGHT: 71 IN | WEIGHT: 228 LBS | BODY MASS INDEX: 31.92 KG/M2

## 2023-05-25 DIAGNOSIS — Z13.31 ENCOUNTER FOR SCREENING FOR DEPRESSION: ICD-10-CM
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
## 2023-05-25 DIAGNOSIS — N39.9 DISORDER OF URINARY SYSTEM, UNSPECIFIED: ICD-10-CM

## 2023-05-25 DIAGNOSIS — Z87.39 PERSONAL HISTORY OF OTHER DISEASES OF THE MUSCULOSKELETAL SYSTEM AND CONNECTIVE TISSUE: ICD-10-CM

## 2023-05-25 DIAGNOSIS — F17.210 NICOTINE DEPENDENCE, CIGARETTES, UNCOMPLICATED: ICD-10-CM

## 2023-05-25 DIAGNOSIS — Z76.89 PERSONS ENCOUNTERING HEALTH SERVICES IN OTHER SPECIFIED CIRCUMSTANCES: ICD-10-CM

## 2023-05-25 DIAGNOSIS — Z86.69 PERSONAL HISTORY OF OTHER DISEASES OF THE NERVOUS SYSTEM AND SENSE ORGANS: ICD-10-CM

## 2023-05-25 PROCEDURE — 96160 PT-FOCUSED HLTH RISK ASSMT: CPT | Mod: 59

## 2023-05-25 PROCEDURE — 99203 OFFICE O/P NEW LOW 30 MIN: CPT | Mod: 25

## 2023-05-25 PROCEDURE — 99406 BEHAV CHNG SMOKING 3-10 MIN: CPT

## 2023-05-25 PROCEDURE — G0444 DEPRESSION SCREEN ANNUAL: CPT | Mod: 59

## 2023-05-26 PROBLEM — Z87.39 HISTORY OF BACK PAIN: Status: RESOLVED | Noted: 2023-05-25 | Resolved: 2023-05-26

## 2023-05-26 PROBLEM — Z13.31 SCREENING FOR DEPRESSION: Status: ACTIVE | Noted: 2023-05-26

## 2023-05-26 PROBLEM — Z76.89 ENCOUNTER TO ESTABLISH CARE: Status: ACTIVE | Noted: 2023-05-25
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
## 2023-05-26 PROBLEM — N39.9 URINARY DISORDER: Status: ACTIVE | Noted: 2023-05-25

## 2023-05-26 PROBLEM — Z86.69 HISTORY OF PERIPHERAL NEUROPATHY: Status: RESOLVED | Noted: 2023-05-25 | Resolved: 2023-05-26

## 2023-05-26 PROBLEM — F17.210 NICOTINE DEPENDENCE, CIGARETTES, UNCOMPLICATED: Status: ACTIVE | Noted: 2023-05-26

## 2023-05-26 NOTE — HEALTH RISK ASSESSMENT
[No] : In the past 12 months have you used drugs other than those required for medical reasons? No [Current] : Current [0-4] : 0-4 [PHQ-2 Negative - No further assessment needed] : PHQ-2 Negative - No further assessment needed [de-identified] : n/a [de-identified] : ENDO,  [de-identified] : 30 years x 1ppd-

## 2023-05-26 NOTE — REVIEW OF SYSTEMS
[Negative] : Psychiatric [Joint Pain] : no joint pain [Back Pain] : no back pain [Skin Rash] : no skin rash [Headache] : no headache [Swollen Glands] : no swollen glands

## 2023-05-26 NOTE — HISTORY OF PRESENT ILLNESS
[FreeTextEntry8] : Mr. NGA MILLER is a 48 year old male who presents to the office to establish care. \par Follows Endocrinology: DMII & low testosterone, hld\par Struggles with leg neuropathy b/l \par Significant hx of cardiac- Dad-quadruple bypass\par \par \par HCM:\par Jber- none prior. \par \par Follows Neurology- [place] ?Name- was on gabapentin 3600/day \par Follows Endocrinology \par Follows Dr. Fraire (Q3 months)

## 2023-05-31 ENCOUNTER — APPOINTMENT (OUTPATIENT)
Dept: ENDOCRINOLOGY | Facility: CLINIC | Age: 48
End: 2023-05-31
Payer: MEDICAID

## 2023-05-31 PROCEDURE — 96372 THER/PROPH/DIAG INJ SC/IM: CPT

## 2023-05-31 RX ORDER — TESTOSTERONE CYPIONATE 200 MG/ML
200 INJECTION, SOLUTION INTRAMUSCULAR
Refills: 0 | Status: COMPLETED | OUTPATIENT
Start: 2023-05-31

## 2023-05-31 RX ADMIN — TESTOSTERONE CYPIONATE 0 MG/ML: 200 INJECTION INTRAMUSCULAR at 00:00

## 2023-06-05 ENCOUNTER — TRANSCRIPTION ENCOUNTER (OUTPATIENT)
Age: 48
End: 2023-06-05

## 2023-06-07 ENCOUNTER — APPOINTMENT (OUTPATIENT)
Dept: ENDOCRINOLOGY | Facility: CLINIC | Age: 48
End: 2023-06-07
Payer: MEDICAID

## 2023-06-07 ENCOUNTER — TRANSCRIPTION ENCOUNTER (OUTPATIENT)
Age: 48
End: 2023-06-07

## 2023-06-07 ENCOUNTER — NON-APPOINTMENT (OUTPATIENT)
Age: 48
End: 2023-06-07

## 2023-06-07 PROCEDURE — 95250 CONT GLUC MNTR PHYS/QHP EQP: CPT

## 2023-06-07 PROCEDURE — 96372 THER/PROPH/DIAG INJ SC/IM: CPT

## 2023-06-07 RX ORDER — TESTOSTERONE CYPIONATE 200 MG/ML
200 INJECTION, SOLUTION INTRAMUSCULAR
Refills: 0 | Status: COMPLETED | OUTPATIENT
Start: 2023-06-07

## 2023-06-07 RX ADMIN — TESTOSTERONE CYPIONATE 0 MG/ML: 200 INJECTION INTRAMUSCULAR at 00:00

## 2023-06-09 ENCOUNTER — TRANSCRIPTION ENCOUNTER (OUTPATIENT)
Age: 48
End: 2023-06-09

## 2023-06-14 ENCOUNTER — APPOINTMENT (OUTPATIENT)
Dept: ENDOCRINOLOGY | Facility: CLINIC | Age: 48
End: 2023-06-14
Payer: MEDICAID

## 2023-06-14 PROCEDURE — 96372 THER/PROPH/DIAG INJ SC/IM: CPT

## 2023-06-14 RX ORDER — TESTOSTERONE CYPIONATE 200 MG/ML
200 INJECTION, SOLUTION INTRAMUSCULAR
Refills: 0 | Status: COMPLETED | OUTPATIENT
Start: 2023-06-14

## 2023-06-14 RX ADMIN — TESTOSTERONE CYPIONATE 0 MG/ML: 200 INJECTION INTRAMUSCULAR at 00:00

## 2023-06-21 ENCOUNTER — APPOINTMENT (OUTPATIENT)
Dept: ENDOCRINOLOGY | Facility: CLINIC | Age: 48
End: 2023-06-21

## 2023-06-21 ENCOUNTER — TRANSCRIPTION ENCOUNTER (OUTPATIENT)
Age: 48
End: 2023-06-21

## 2023-06-28 ENCOUNTER — APPOINTMENT (OUTPATIENT)
Dept: ENDOCRINOLOGY | Facility: CLINIC | Age: 48
End: 2023-06-28
Payer: MEDICAID

## 2023-06-28 PROCEDURE — 96372 THER/PROPH/DIAG INJ SC/IM: CPT

## 2023-06-28 RX ORDER — TESTOSTERONE CYPIONATE 200 MG/ML
200 INJECTION, SOLUTION INTRAMUSCULAR
Refills: 0 | Status: COMPLETED | OUTPATIENT
Start: 2023-06-28

## 2023-06-28 RX ADMIN — TESTOSTERONE CYPIONATE 0 MG/ML: 200 INJECTION INTRAMUSCULAR at 00:00

## 2023-07-05 ENCOUNTER — APPOINTMENT (OUTPATIENT)
Dept: ENDOCRINOLOGY | Facility: CLINIC | Age: 48
End: 2023-07-05
Payer: MEDICAID

## 2023-07-05 LAB
HBA1C MFR BLD HPLC: 9.5
LDLC SERPL DIRECT ASSAY-MCNC: 66
MICROALBUMIN/CREAT 24H UR-RTO: 7
TSH SERPL-ACNC: 2.86

## 2023-07-05 PROCEDURE — 96372 THER/PROPH/DIAG INJ SC/IM: CPT

## 2023-07-05 RX ORDER — TESTOSTERONE CYPIONATE 200 MG/ML
200 INJECTION, SOLUTION INTRAMUSCULAR
Refills: 0 | Status: COMPLETED | OUTPATIENT
Start: 2023-07-05

## 2023-07-05 RX ADMIN — TESTOSTERONE CYPIONATE 0 MG/ML: 200 INJECTION INTRAMUSCULAR at 00:00

## 2023-07-06 ENCOUNTER — APPOINTMENT (OUTPATIENT)
Dept: ENDOCRINOLOGY | Facility: CLINIC | Age: 48
End: 2023-07-06
Payer: MEDICAID

## 2023-07-06 VITALS
DIASTOLIC BLOOD PRESSURE: 82 MMHG | SYSTOLIC BLOOD PRESSURE: 130 MMHG | HEART RATE: 86 BPM | BODY MASS INDEX: 32.2 KG/M2 | HEIGHT: 71 IN | WEIGHT: 230 LBS | OXYGEN SATURATION: 97 %

## 2023-07-06 LAB — GLUCOSE BLDC GLUCOMTR-MCNC: 274

## 2023-07-06 PROCEDURE — 82962 GLUCOSE BLOOD TEST: CPT

## 2023-07-06 PROCEDURE — 95251 CONT GLUC MNTR ANALYSIS I&R: CPT

## 2023-07-06 PROCEDURE — 99214 OFFICE O/P EST MOD 30 MIN: CPT | Mod: 25

## 2023-07-06 RX ORDER — SEMAGLUTIDE 0.68 MG/ML
2 INJECTION, SOLUTION SUBCUTANEOUS
Qty: 3 | Refills: 0 | Status: ACTIVE | COMMUNITY
Start: 2023-07-06 | End: 1900-01-01

## 2023-07-06 RX ORDER — TESTOSTERONE CYPIONATE 200 MG/ML
200 INJECTION, SOLUTION INTRAMUSCULAR
Qty: 0.25 | Refills: 0 | Status: DISCONTINUED | COMMUNITY
Start: 2021-07-16 | End: 2023-07-06

## 2023-07-06 NOTE — REVIEW OF SYSTEMS
[Fatigue] : no fatigue [Recent Weight Gain (___ Lbs)] : no recent weight gain [Recent Weight Loss (___ Lbs)] : no recent weight loss [Visual Field Defect] : no visual field defect [Blurred Vision] : no blurred vision [Dysphagia] : no dysphagia [Dysphonia] : no dysphonia [Chest Pain] : no chest pain [Shortness Of Breath] : no shortness of breath [Cough] : cough [SOB on Exertion] : shortness of breath on exertion [Constipation] : no constipation [Diarrhea] : no diarrhea [Polyuria] : no polyuria [Polydipsia] : no polydipsia

## 2023-07-06 NOTE — PHYSICAL EXAM

## 2023-07-06 NOTE — ASSESSMENT
[FreeTextEntry1] : T2DM\par -Bibiana pro reflects that patient would benefit from trying a glp1 again. Has been many years since he tried victoza and did not tolerate. Insurance should now cover Ozempic.\par -Begin Ozempi 0.25 mg weekly x 4 weeks, then increase if appropriate\par -Continue Glimepriride to 4 mg BID\par -Continue Jardiance 25 mg daily\par -Continue MFN 1000 mg BID\par -STOP Alogliptin 25 mg daily\par -Continue checking BS 2-3x a day. Send logs in every 2 weeks. If blood sugars begin to trend low, pt will call office for med changes\par -Must watch diet, no potato bread!\par -Increase exercise as tolerated, goal of 30 mins a day 5x a day\par -Continue to follow up with all specialists including ophtho, podiatry, neurology \par -Stop smoking! \par \par HTN\par -Will continue to monitor, likely will need aceI for kidney protection \par \par \par HLD\par -Continue Gemfibrozil and statin\par -Increase exercise efforts\par -Need cardiology referral \par \par Low Testosterone \par -Labs were done on day 5/7 and testosterone was not at goal but previously this dose of testosterone was appropriate for patient when labs done mid cycle. \par -Continue 50 mg weekly of testosterone injection\par \par Will use [facility] care to faciliate cardiology and pulmonology \par \par RTO 6 weeks

## 2023-07-06 NOTE — HISTORY OF PRESENT ILLNESS
[FreeTextEntry1] : Pt has a chronic cough over last month.\par Pt still trying to find a therapist he feels he will get along with and takes his insurance.\par Pt very depressed. \par Pt has periods of depression where he "gives up" and stops taking diabetic medications.\par \par Actively looking for work but has been turned down from 63 jobs.\par \par T2DM\par Severity: poor control\par Onset: routine \par Duration: approx 4-5 years\par Modifying Factors: never has used insulin before\par \par SMBG\par Checks FS 2x a day\par FS on average 100s, rare 200s \par \par Bibiana PRO\par Average glucose 255\par GMI 9.4%\par Glucose variability 30.5%\par \par Very high 51%\par High 30%\par In range 19%\par Low 0%\par Very low 0% \par \par Fs in office 274-cheeseburger from [facility]\par \par HGA1C- 6/2023-9.5\par \par Current drug regimen\par MFN 1000 MG BID\par Jaridance 25 mg daily\par Alogliptin 25 mg daily\par Glimepiride 4 mg BID\par \par Could not tolerate Victoza due to extreme N/V\par \par Eye exam: denies DR- LV 12/2022\par Foot exam: Saw podiatry- +severe neuropathy, on gabapentin 1200 mg TID\par Kidney disease: denies \par Heart disease: denies but FH, father with CAD\par \par Weight: stable\par Diet:-eating a lot of pb and jelly \par Drink- water \par Exercise: tries to lift weights when he can, nothing consistent \par Smoking: a pack a day smoker \par \par HLD\par Gemfibrizol 600 mg daily\par Atorvastatin 20 mg daily \par FH of high triglycerides\par Triglcerides 536, Total cholesterol 172, HDL 23, LDL 66\par Strong FH of MI/heart disease\par \par HTN\par BP in office 130/82\par On no medications\par \par Low Testosterone \par Approx 9 years\par Has an 18 year old son\par On 50 mg of testosterone every week\par Had brain MRI 2016 which was negative for pituitary adenoma \par FH : Dad on androgel and felt very angry \par Total testosterone 335 (did this on 5/7 of week cycle)\par \par Low Vitamin D\par 50,000 IU supplement weekly\par ON LABS 30.4

## 2023-07-07 ENCOUNTER — TRANSCRIPTION ENCOUNTER (OUTPATIENT)
Age: 48
End: 2023-07-07

## 2023-07-12 ENCOUNTER — APPOINTMENT (OUTPATIENT)
Dept: ENDOCRINOLOGY | Facility: CLINIC | Age: 48
End: 2023-07-12
Payer: MEDICAID

## 2023-07-12 PROCEDURE — 96372 THER/PROPH/DIAG INJ SC/IM: CPT

## 2023-07-12 RX ORDER — TESTOSTERONE CYPIONATE 200 MG/ML
200 INJECTION, SOLUTION INTRAMUSCULAR
Refills: 0 | Status: COMPLETED | OUTPATIENT
Start: 2023-07-12

## 2023-07-12 RX ADMIN — TESTOSTERONE CYPIONATE 0 MG/ML: 200 INJECTION INTRAMUSCULAR at 00:00

## 2023-07-19 ENCOUNTER — APPOINTMENT (OUTPATIENT)
Dept: ENDOCRINOLOGY | Facility: CLINIC | Age: 48
End: 2023-07-19
Payer: MEDICAID

## 2023-07-19 PROCEDURE — 96372 THER/PROPH/DIAG INJ SC/IM: CPT

## 2023-07-19 RX ORDER — TESTOSTERONE CYPIONATE 200 MG/ML
200 INJECTION, SOLUTION INTRAMUSCULAR
Refills: 0 | Status: COMPLETED | OUTPATIENT
Start: 2023-07-19

## 2023-07-19 RX ADMIN — TESTOSTERONE CYPIONATE 0 MG/ML: 200 INJECTION INTRAMUSCULAR at 00:00

## 2023-07-23 PROBLEM — Z00.00 ENCOUNTER FOR PREVENTIVE HEALTH EXAMINATION: Status: ACTIVE | Noted: 2020-07-09

## 2023-07-25 ENCOUNTER — APPOINTMENT (OUTPATIENT)
Dept: FAMILY MEDICINE | Facility: CLINIC | Age: 48
End: 2023-07-25
Payer: MEDICAID

## 2023-07-25 VITALS
OXYGEN SATURATION: 95 % | SYSTOLIC BLOOD PRESSURE: 145 MMHG | BODY MASS INDEX: 33.32 KG/M2 | DIASTOLIC BLOOD PRESSURE: 88 MMHG | RESPIRATION RATE: 18 BRPM | WEIGHT: 238 LBS | HEART RATE: 91 BPM | TEMPERATURE: 98 F | HEIGHT: 71 IN

## 2023-07-25 DIAGNOSIS — Z12.11 ENCOUNTER FOR SCREENING FOR MALIGNANT NEOPLASM OF COLON: ICD-10-CM

## 2023-07-25 DIAGNOSIS — Z00.00 ENCOUNTER FOR GENERAL ADULT MEDICAL EXAMINATION W/OUT ABNORMAL FINDINGS: ICD-10-CM

## 2023-07-25 DIAGNOSIS — F17.200 NICOTINE DEPENDENCE, UNSPECIFIED, UNCOMPLICATED: ICD-10-CM

## 2023-07-25 DIAGNOSIS — R05.3 CHRONIC COUGH: ICD-10-CM

## 2023-07-25 PROCEDURE — 99406 BEHAV CHNG SMOKING 3-10 MIN: CPT

## 2023-07-25 PROCEDURE — 96160 PT-FOCUSED HLTH RISK ASSMT: CPT

## 2023-07-25 PROCEDURE — G0447 BEHAVIOR COUNSEL OBESITY 15M: CPT

## 2023-07-25 PROCEDURE — 99396 PREV VISIT EST AGE 40-64: CPT | Mod: 25

## 2023-07-25 PROCEDURE — 92552 PURE TONE AUDIOMETRY AIR: CPT

## 2023-07-25 NOTE — HEALTH RISK ASSESSMENT
[Good] : ~his/her~  mood as  good [No] : In the past 12 months have you used drugs other than those required for medical reasons? No [No falls in past year] : Patient reported no falls in the past year [No Retinopathy] : No retinopathy [None] : None [With Family] : lives with family [On disability] : on disability [Single] : single [Feels Safe at Home] : Feels safe at home [Fully functional (bathing, dressing, toileting, transferring, walking, feeding)] : Fully functional (bathing, dressing, toileting, transferring, walking, feeding) [Fully functional (using the telephone, shopping, preparing meals, housekeeping, doing laundry, using] : Fully functional and needs no help or supervision to perform IADLs (using the telephone, shopping, preparing meals, housekeeping, doing laundry, using transportation, managing medications and managing finances) [Smoke Detector] : smoke detector [Carbon Monoxide Detector] : carbon monoxide detector [Seat Belt] :  uses seat belt [Sunscreen] : uses sunscreen [Current] : Current [0-4] : 0-4 [FreeTextEntry1] : cough for past 2 months- afebrile, clogged ears/nasal , mild wheezing (inspiratory)  [de-identified] : n/a [de-identified] : ENDO, CARDIO ([facility]), PODIATRIST (Q3 month), NEURO,  [de-identified] : walking, yard work.  [de-identified] : portion controlled.  [EyeExamDate] : 12/2022 [Change in mental status noted] : No change in mental status noted [Reports changes in hearing] : Reports no changes in hearing [Reports changes in vision] : Reports no changes in vision [Reports changes in dental health] : Reports no changes in dental health [Guns at Home] : no guns at home [Travel to Developing Areas] : does not  travel to developing areas [ColonoscopyDate] : none [de-identified] : lives with mother [de-identified] : was a , stopped due to neuropathy  [de-identified] : cut down to 5-6 cigarettes for  30 years.

## 2023-07-25 NOTE — ASSESSMENT
[FreeTextEntry1] : Annual physical/wellness visit performed today\par Routine measurements including height, weight, BMI, and blood pressure performed.\par Medications reviewed and reconciled. \par Tobacco, alcohol, and drug screening performed. \par Annual depression screening performed. \par Diet and exercise discussed. \par \par In office hearing test performed:\par   Test   Result   Flag   Reference \par   Left 500Hz Pass     \par   Left 1000Hz Pass     \par   Left 2000Hz Pass     \par   Left 4000Hz Pass     \par   Left 6000 Hz Pass     \par   Left 8000 Hz Pass     \par   Right 500Hz Pass     \par   Right 1000Hz Pass     \par   Right 2000Hz Pass     \par   Right 4000Hz Pass     \par   Right 6000 Hz Pass     \par   Right 8000 Hz Pass     \par \par EKG not performed- pt follows Cardio ([facility])

## 2023-07-25 NOTE — HISTORY OF PRESENT ILLNESS
[FreeTextEntry1] : CPE [de-identified] : Mr. NGA MILLER is a 48 year old male who presents to the office for CPE\par Pt recently started ozempic medication, has been experiencing nausea and intermittent episodes of diarrhea, will be following up with Endocrinologist tomorrow. Takes every Monday.

## 2023-07-25 NOTE — PHYSICAL EXAM
[Supple] : supple [Pedal Pulses Present] : the pedal pulses are present [No Edema] : there was no peripheral edema [No Extremity Clubbing/Cyanosis] : no extremity clubbing/cyanosis [Soft] : abdomen soft [Non Tender] : non-tender [Non-distended] : non-distended [Normal Bowel Sounds] : normal bowel sounds [No Spinal Tenderness] : no spinal tenderness [Grossly Normal Strength/Tone] : grossly normal strength/tone [Normal] : affect was normal and insight and judgment were intact [de-identified] : clear PND, impacted cerumen R ear, minimal ear fluid behind left ear- no inner ear erythema  [de-identified] : mild upper airway wheeze

## 2023-07-25 NOTE — REVIEW OF SYSTEMS
[Postnasal Drip] : postnasal drip [Wheezing] : wheezing [Cough] : cough [Negative] : Psychiatric [Shortness Of Breath] : no shortness of breath [Dyspnea on Exertion] : not dyspnea on exertion [Joint Pain] : no joint pain [Back Pain] : no back pain [Skin Rash] : no skin rash [Headache] : no headache [Swollen Glands] : no swollen glands

## 2023-07-25 NOTE — COUNSELING
[Behavioral health counseling provided] : Behavioral health counseling provided [Sleep ___ hours/day] : Sleep [unfilled] hours/day [Engage in a relaxing activity] : Engage in a relaxing activity [Plan in advance] : Plan in advance [Potential consequences of obesity discussed] : Potential consequences of obesity discussed [Benefits of weight loss discussed] : Benefits of weight loss discussed [Encouraged to maintain food diary] : Encouraged to maintain food diary [Encouraged to increase physical activity] : Encouraged to increase physical activity [Encouraged to use exercise tracking device] : Encouraged to use exercise tracking device [Decrease Portions] : decrease portions [____ min/wk Activity] : [unfilled] min/wk activity [Keep Food Diary] : keep food diary [None] : None [Good understanding] : Patient has a good understanding of lifestyle changes and steps needed to achieve self management goal [Cessation strategies including cessation program discussed] : Cessation strategies including cessation program discussed [Use of nicotine replacement therapies and other medications discussed] : Use of nicotine replacement therapies and other medications discussed [Encouraged to pick a quit date and identify support needed to quit] : Encouraged to pick a quit date and identify support needed to quit [Yes] : Willing to quit smoking [FreeTextEntry1] : 4 [FreeTextEntry4] : 15

## 2023-07-26 ENCOUNTER — APPOINTMENT (OUTPATIENT)
Dept: ENDOCRINOLOGY | Facility: CLINIC | Age: 48
End: 2023-07-26
Payer: MEDICAID

## 2023-07-26 PROCEDURE — 96372 THER/PROPH/DIAG INJ SC/IM: CPT

## 2023-07-26 RX ORDER — TESTOSTERONE CYPIONATE 200 MG/ML
200 INJECTION, SOLUTION INTRAMUSCULAR
Refills: 0 | Status: COMPLETED | OUTPATIENT
Start: 2023-07-26

## 2023-08-02 ENCOUNTER — APPOINTMENT (OUTPATIENT)
Dept: ENDOCRINOLOGY | Facility: CLINIC | Age: 48
End: 2023-08-02
Payer: MEDICAID

## 2023-08-02 PROCEDURE — 96372 THER/PROPH/DIAG INJ SC/IM: CPT

## 2023-08-02 RX ORDER — TESTOSTERONE CYPIONATE 200 MG/ML
200 INJECTION, SOLUTION INTRAMUSCULAR
Refills: 0 | Status: COMPLETED | OUTPATIENT
Start: 2023-08-02

## 2023-08-02 RX ADMIN — TESTOSTERONE CYPIONATE 0 MG/ML: 200 INJECTION INTRAMUSCULAR at 00:00

## 2023-08-09 ENCOUNTER — APPOINTMENT (OUTPATIENT)
Dept: ENDOCRINOLOGY | Facility: CLINIC | Age: 48
End: 2023-08-09
Payer: MEDICAID

## 2023-08-09 PROCEDURE — 96372 THER/PROPH/DIAG INJ SC/IM: CPT

## 2023-08-09 RX ORDER — TESTOSTERONE CYPIONATE 200 MG/ML
200 INJECTION, SOLUTION INTRAMUSCULAR
Refills: 0 | Status: COMPLETED | OUTPATIENT
Start: 2023-08-09

## 2023-08-09 RX ADMIN — TESTOSTERONE CYPIONATE 0 MG/ML: 200 INJECTION INTRAMUSCULAR at 00:00

## 2023-08-15 ENCOUNTER — APPOINTMENT (OUTPATIENT)
Dept: PULMONOLOGY | Facility: CLINIC | Age: 48
End: 2023-08-15

## 2023-08-16 ENCOUNTER — APPOINTMENT (OUTPATIENT)
Dept: ENDOCRINOLOGY | Facility: CLINIC | Age: 48
End: 2023-08-16
Payer: MEDICAID

## 2023-08-16 PROCEDURE — 96372 THER/PROPH/DIAG INJ SC/IM: CPT

## 2023-08-16 RX ORDER — TESTOSTERONE CYPIONATE 200 MG/ML
200 INJECTION, SOLUTION INTRAMUSCULAR
Refills: 0 | Status: COMPLETED | OUTPATIENT
Start: 2023-08-16

## 2023-08-16 RX ADMIN — TESTOSTERONE CYPIONATE 0 MG/ML: 200 INJECTION INTRAMUSCULAR at 00:00

## 2023-08-17 ENCOUNTER — TRANSCRIPTION ENCOUNTER (OUTPATIENT)
Age: 48
End: 2023-08-17

## 2023-08-23 ENCOUNTER — APPOINTMENT (OUTPATIENT)
Dept: ENDOCRINOLOGY | Facility: CLINIC | Age: 48
End: 2023-08-23

## 2023-08-24 ENCOUNTER — TRANSCRIPTION ENCOUNTER (OUTPATIENT)
Age: 48
End: 2023-08-24

## 2023-08-30 ENCOUNTER — APPOINTMENT (OUTPATIENT)
Dept: ENDOCRINOLOGY | Facility: CLINIC | Age: 48
End: 2023-08-30
Payer: MEDICAID

## 2023-08-30 ENCOUNTER — TRANSCRIPTION ENCOUNTER (OUTPATIENT)
Age: 48
End: 2023-08-30

## 2023-08-30 PROCEDURE — 96372 THER/PROPH/DIAG INJ SC/IM: CPT

## 2023-08-30 RX ORDER — TESTOSTERONE CYPIONATE 200 MG/ML
200 INJECTION, SOLUTION INTRAMUSCULAR
Refills: 0 | Status: COMPLETED | OUTPATIENT
Start: 2023-08-30

## 2023-08-30 RX ADMIN — TESTOSTERONE CYPIONATE 0 MG/ML: 200 INJECTION INTRAMUSCULAR at 00:00

## 2023-09-06 ENCOUNTER — APPOINTMENT (OUTPATIENT)
Dept: ENDOCRINOLOGY | Facility: CLINIC | Age: 48
End: 2023-09-06
Payer: MEDICAID

## 2023-09-06 PROCEDURE — 96372 THER/PROPH/DIAG INJ SC/IM: CPT

## 2023-09-06 RX ORDER — TESTOSTERONE CYPIONATE 200 MG/ML
200 INJECTION, SOLUTION INTRAMUSCULAR
Refills: 0 | Status: COMPLETED | OUTPATIENT
Start: 2023-09-06

## 2023-09-06 RX ADMIN — TESTOSTERONE CYPIONATE 0 MG/ML: 200 INJECTION INTRAMUSCULAR at 00:00

## 2023-09-13 ENCOUNTER — APPOINTMENT (OUTPATIENT)
Dept: ENDOCRINOLOGY | Facility: CLINIC | Age: 48
End: 2023-09-13
Payer: MEDICAID

## 2023-09-13 VITALS
WEIGHT: 222 LBS | HEART RATE: 84 BPM | HEIGHT: 71 IN | BODY MASS INDEX: 31.08 KG/M2 | OXYGEN SATURATION: 96 % | SYSTOLIC BLOOD PRESSURE: 114 MMHG | DIASTOLIC BLOOD PRESSURE: 70 MMHG

## 2023-09-13 LAB — GLUCOSE BLDC GLUCOMTR-MCNC: 190

## 2023-09-13 PROCEDURE — 82962 GLUCOSE BLOOD TEST: CPT

## 2023-09-13 PROCEDURE — 96372 THER/PROPH/DIAG INJ SC/IM: CPT

## 2023-09-13 PROCEDURE — 99214 OFFICE O/P EST MOD 30 MIN: CPT | Mod: 25

## 2023-09-13 RX ORDER — ALOGLIPTIN 25 MG/1
25 TABLET, FILM COATED ORAL
Qty: 90 | Refills: 1 | Status: DISCONTINUED | COMMUNITY
Start: 2020-10-20 | End: 2023-09-13

## 2023-09-13 RX ORDER — TESTOSTERONE CYPIONATE 200 MG/ML
200 INJECTION, SOLUTION INTRAMUSCULAR
Refills: 0 | Status: COMPLETED | OUTPATIENT
Start: 2023-09-13

## 2023-09-13 RX ADMIN — TESTOSTERONE CYPIONATE 0 MG/ML: 200 INJECTION INTRAMUSCULAR at 00:00

## 2023-09-20 ENCOUNTER — APPOINTMENT (OUTPATIENT)
Dept: ENDOCRINOLOGY | Facility: CLINIC | Age: 48
End: 2023-09-20
Payer: MEDICAID

## 2023-09-20 PROCEDURE — 96372 THER/PROPH/DIAG INJ SC/IM: CPT

## 2023-09-20 RX ORDER — TESTOSTERONE CYPIONATE 200 MG/ML
200 INJECTION, SOLUTION INTRAMUSCULAR
Refills: 0 | Status: COMPLETED | OUTPATIENT
Start: 2023-09-20

## 2023-09-20 RX ADMIN — TESTOSTERONE CYPIONATE 0 MG/ML: 200 INJECTION INTRAMUSCULAR at 00:00

## 2023-09-27 ENCOUNTER — APPOINTMENT (OUTPATIENT)
Dept: ENDOCRINOLOGY | Facility: CLINIC | Age: 48
End: 2023-09-27
Payer: MEDICAID

## 2023-09-27 PROCEDURE — 96372 THER/PROPH/DIAG INJ SC/IM: CPT

## 2023-09-27 RX ORDER — TESTOSTERONE CYPIONATE 200 MG/ML
200 INJECTION, SOLUTION INTRAMUSCULAR
Refills: 0 | Status: COMPLETED | OUTPATIENT
Start: 2023-09-27

## 2023-09-27 RX ADMIN — TESTOSTERONE CYPIONATE 0 MG/ML: 200 INJECTION INTRAMUSCULAR at 00:00

## 2023-10-04 ENCOUNTER — APPOINTMENT (OUTPATIENT)
Dept: ENDOCRINOLOGY | Facility: CLINIC | Age: 48
End: 2023-10-04
Payer: MEDICAID

## 2023-10-04 PROCEDURE — 96372 THER/PROPH/DIAG INJ SC/IM: CPT

## 2023-10-04 RX ORDER — TESTOSTERONE CYPIONATE 200 MG/ML
200 INJECTION, SOLUTION INTRAMUSCULAR
Refills: 0 | Status: COMPLETED | OUTPATIENT
Start: 2023-10-04

## 2023-10-04 RX ADMIN — TESTOSTERONE CYPIONATE 0 MG/ML: 200 INJECTION INTRAMUSCULAR at 00:00

## 2023-10-11 ENCOUNTER — APPOINTMENT (OUTPATIENT)
Dept: ENDOCRINOLOGY | Facility: CLINIC | Age: 48
End: 2023-10-11
Payer: MEDICAID

## 2023-10-11 PROCEDURE — 96372 THER/PROPH/DIAG INJ SC/IM: CPT

## 2023-10-11 RX ORDER — TESTOSTERONE CYPIONATE 200 MG/ML
200 INJECTION, SOLUTION INTRAMUSCULAR
Refills: 0 | Status: COMPLETED | OUTPATIENT
Start: 2023-10-11

## 2023-10-11 RX ADMIN — TESTOSTERONE CYPIONATE 0 MG/ML: 200 INJECTION INTRAMUSCULAR at 00:00

## 2023-10-18 ENCOUNTER — APPOINTMENT (OUTPATIENT)
Dept: ENDOCRINOLOGY | Facility: CLINIC | Age: 48
End: 2023-10-18
Payer: MEDICAID

## 2023-10-18 PROCEDURE — 96372 THER/PROPH/DIAG INJ SC/IM: CPT

## 2023-10-18 RX ORDER — TESTOSTERONE CYPIONATE 200 MG/ML
200 INJECTION, SOLUTION INTRAMUSCULAR
Refills: 0 | Status: COMPLETED | OUTPATIENT
Start: 2023-10-18

## 2023-10-18 RX ADMIN — TESTOSTERONE CYPIONATE 0 MG/ML: 200 INJECTION INTRAMUSCULAR at 00:00

## 2023-10-20 ENCOUNTER — APPOINTMENT (OUTPATIENT)
Dept: ENDOCRINOLOGY | Facility: CLINIC | Age: 48
End: 2023-10-20

## 2023-10-26 ENCOUNTER — APPOINTMENT (OUTPATIENT)
Dept: ENDOCRINOLOGY | Facility: CLINIC | Age: 48
End: 2023-10-26

## 2023-11-01 ENCOUNTER — APPOINTMENT (OUTPATIENT)
Dept: ENDOCRINOLOGY | Facility: CLINIC | Age: 48
End: 2023-11-01
Payer: MEDICAID

## 2023-11-01 PROCEDURE — 96372 THER/PROPH/DIAG INJ SC/IM: CPT

## 2023-11-01 RX ORDER — TESTOSTERONE CYPIONATE 200 MG/ML
200 INJECTION, SOLUTION INTRAMUSCULAR
Refills: 0 | Status: COMPLETED | OUTPATIENT
Start: 2023-11-01

## 2023-11-01 RX ADMIN — TESTOSTERONE CYPIONATE 0 MG/ML: 200 INJECTION INTRAMUSCULAR at 00:00

## 2023-11-08 ENCOUNTER — APPOINTMENT (OUTPATIENT)
Dept: ENDOCRINOLOGY | Facility: CLINIC | Age: 48
End: 2023-11-08
Payer: MEDICAID

## 2023-11-08 PROCEDURE — 96372 THER/PROPH/DIAG INJ SC/IM: CPT

## 2023-11-08 RX ORDER — TESTOSTERONE CYPIONATE 200 MG/ML
200 INJECTION, SOLUTION INTRAMUSCULAR
Refills: 0 | Status: COMPLETED | OUTPATIENT
Start: 2023-11-08

## 2023-11-08 RX ADMIN — TESTOSTERONE CYPIONATE 0 MG/ML: 200 INJECTION INTRAMUSCULAR at 00:00

## 2023-11-14 ENCOUNTER — RX RENEWAL (OUTPATIENT)
Age: 48
End: 2023-11-14

## 2023-11-15 ENCOUNTER — APPOINTMENT (OUTPATIENT)
Dept: ENDOCRINOLOGY | Facility: CLINIC | Age: 48
End: 2023-11-15
Payer: MEDICAID

## 2023-11-15 PROCEDURE — 96372 THER/PROPH/DIAG INJ SC/IM: CPT

## 2023-11-15 RX ORDER — TESTOSTERONE CYPIONATE 200 MG/ML
200 INJECTION, SOLUTION INTRAMUSCULAR
Refills: 0 | Status: COMPLETED | OUTPATIENT
Start: 2023-11-15

## 2023-11-15 RX ADMIN — TESTOSTERONE CYPIONATE 0 MG/ML: 200 INJECTION INTRAMUSCULAR at 00:00

## 2023-11-22 ENCOUNTER — APPOINTMENT (OUTPATIENT)
Dept: ENDOCRINOLOGY | Facility: CLINIC | Age: 48
End: 2023-11-22
Payer: MEDICAID

## 2023-11-22 PROCEDURE — 96372 THER/PROPH/DIAG INJ SC/IM: CPT

## 2023-11-22 RX ORDER — TESTOSTERONE CYPIONATE 200 MG/ML
200 INJECTION, SOLUTION INTRAMUSCULAR
Refills: 0 | Status: COMPLETED | OUTPATIENT
Start: 2023-11-22

## 2023-11-22 RX ADMIN — TESTOSTERONE CYPIONATE 0 MG/ML: 200 INJECTION INTRAMUSCULAR at 00:00

## 2023-11-29 ENCOUNTER — APPOINTMENT (OUTPATIENT)
Dept: ENDOCRINOLOGY | Facility: CLINIC | Age: 48
End: 2023-11-29
Payer: MEDICAID

## 2023-11-29 PROCEDURE — 96372 THER/PROPH/DIAG INJ SC/IM: CPT

## 2023-11-29 RX ORDER — TESTOSTERONE CYPIONATE 200 MG/ML
200 INJECTION, SOLUTION INTRAMUSCULAR
Refills: 0 | Status: COMPLETED | OUTPATIENT
Start: 2023-11-29

## 2023-11-29 RX ADMIN — TESTOSTERONE CYPIONATE 0 MG/ML: 200 INJECTION INTRAMUSCULAR at 00:00

## 2023-12-06 ENCOUNTER — APPOINTMENT (OUTPATIENT)
Dept: ENDOCRINOLOGY | Facility: CLINIC | Age: 48
End: 2023-12-06
Payer: MEDICAID

## 2023-12-06 VITALS
WEIGHT: 225 LBS | HEART RATE: 83 BPM | DIASTOLIC BLOOD PRESSURE: 78 MMHG | BODY MASS INDEX: 31.5 KG/M2 | HEIGHT: 71 IN | SYSTOLIC BLOOD PRESSURE: 110 MMHG | OXYGEN SATURATION: 98 %

## 2023-12-06 LAB
GLUCOSE BLDC GLUCOMTR-MCNC: 393
GLUCOSE BLDC GLUCOMTR-MCNC: 410

## 2023-12-06 PROCEDURE — 96372 THER/PROPH/DIAG INJ SC/IM: CPT

## 2023-12-06 PROCEDURE — 82962 GLUCOSE BLOOD TEST: CPT

## 2023-12-06 PROCEDURE — 99214 OFFICE O/P EST MOD 30 MIN: CPT | Mod: 25

## 2023-12-06 RX ORDER — ALOGLIPTIN 25 MG/1
25 TABLET, FILM COATED ORAL
Qty: 90 | Refills: 1 | Status: DISCONTINUED | COMMUNITY
Start: 2023-12-06 | End: 2023-12-06

## 2023-12-06 RX ORDER — CITALOPRAM HYDROBROMIDE 20 MG/1
20 TABLET, FILM COATED ORAL
Qty: 30 | Refills: 0 | Status: DISCONTINUED | COMMUNITY
Start: 2022-04-20 | End: 2023-12-06

## 2023-12-06 RX ORDER — TESTOSTERONE CYPIONATE 200 MG/ML
200 INJECTION, SOLUTION INTRAMUSCULAR
Refills: 0 | Status: COMPLETED | OUTPATIENT
Start: 2023-12-06

## 2023-12-06 RX ORDER — ALOGLIPTIN 25 MG/1
25 TABLET, FILM COATED ORAL DAILY
Qty: 90 | Refills: 0 | Status: ACTIVE | COMMUNITY
Start: 2023-12-06 | End: 1900-01-01

## 2023-12-06 RX ORDER — ESCITALOPRAM OXALATE 10 MG/1
10 TABLET, FILM COATED ORAL
Refills: 0 | Status: ACTIVE | COMMUNITY

## 2023-12-06 RX ORDER — GABAPENTIN 600 MG/1
600 TABLET, COATED ORAL
Qty: 180 | Refills: 0 | Status: DISCONTINUED | COMMUNITY
Start: 2022-02-17 | End: 2023-12-06

## 2023-12-06 RX ADMIN — TESTOSTERONE CYPIONATE 0 MG/ML: 200 INJECTION INTRAMUSCULAR at 00:00

## 2023-12-07 ENCOUNTER — TRANSCRIPTION ENCOUNTER (OUTPATIENT)
Age: 48
End: 2023-12-07

## 2023-12-08 ENCOUNTER — TRANSCRIPTION ENCOUNTER (OUTPATIENT)
Age: 48
End: 2023-12-08

## 2023-12-13 ENCOUNTER — APPOINTMENT (OUTPATIENT)
Dept: ENDOCRINOLOGY | Facility: CLINIC | Age: 48
End: 2023-12-13
Payer: MEDICAID

## 2023-12-13 PROCEDURE — 96372 THER/PROPH/DIAG INJ SC/IM: CPT

## 2023-12-13 RX ORDER — TESTOSTERONE CYPIONATE 200 MG/ML
200 INJECTION, SOLUTION INTRAMUSCULAR
Refills: 0 | Status: COMPLETED | OUTPATIENT
Start: 2023-12-13

## 2023-12-20 ENCOUNTER — APPOINTMENT (OUTPATIENT)
Dept: ENDOCRINOLOGY | Facility: CLINIC | Age: 48
End: 2023-12-20
Payer: MEDICAID

## 2023-12-20 PROCEDURE — 96372 THER/PROPH/DIAG INJ SC/IM: CPT

## 2023-12-20 RX ORDER — TESTOSTERONE CYPIONATE 200 MG/ML
200 INJECTION, SOLUTION INTRAMUSCULAR
Refills: 0 | Status: COMPLETED | OUTPATIENT
Start: 2023-12-20

## 2023-12-27 ENCOUNTER — APPOINTMENT (OUTPATIENT)
Dept: ENDOCRINOLOGY | Facility: CLINIC | Age: 48
End: 2023-12-27
Payer: MEDICAID

## 2023-12-27 PROCEDURE — 96372 THER/PROPH/DIAG INJ SC/IM: CPT

## 2023-12-27 RX ORDER — TESTOSTERONE CYPIONATE 200 MG/ML
200 INJECTION, SOLUTION INTRAMUSCULAR
Refills: 0 | Status: COMPLETED | OUTPATIENT
Start: 2023-12-27

## 2023-12-27 RX ADMIN — TESTOSTERONE CYPIONATE 0 MG/ML: 200 INJECTION INTRAMUSCULAR at 00:00

## 2024-01-07 NOTE — HISTORY OF PRESENT ILLNESS
[FreeTextEntry1] : follow-up  [de-identified] : Mr. NGA MILLER is a 48 year old male who presents to the office for follow-up

## 2024-01-07 NOTE — SIGNATURES
[TextEntry] : Krupa Fiore D.O.  Family Medicine [place] [address] [phone]

## 2024-01-08 ENCOUNTER — APPOINTMENT (OUTPATIENT)
Dept: FAMILY MEDICINE | Facility: CLINIC | Age: 49
End: 2024-01-08

## 2024-01-10 ENCOUNTER — APPOINTMENT (OUTPATIENT)
Dept: ENDOCRINOLOGY | Facility: CLINIC | Age: 49
End: 2024-01-10
Payer: MEDICAID

## 2024-01-10 PROCEDURE — 96372 THER/PROPH/DIAG INJ SC/IM: CPT

## 2024-01-10 RX ORDER — TESTOSTERONE CYPIONATE 200 MG/ML
200 INJECTION, SOLUTION INTRAMUSCULAR
Refills: 0 | Status: COMPLETED | OUTPATIENT
Start: 2024-01-10

## 2024-01-17 ENCOUNTER — APPOINTMENT (OUTPATIENT)
Dept: ENDOCRINOLOGY | Facility: CLINIC | Age: 49
End: 2024-01-17
Payer: MEDICAID

## 2024-01-17 PROCEDURE — 96372 THER/PROPH/DIAG INJ SC/IM: CPT

## 2024-01-17 RX ORDER — TESTOSTERONE CYPIONATE 200 MG/ML
200 INJECTION, SOLUTION INTRAMUSCULAR
Refills: 0 | Status: COMPLETED | OUTPATIENT
Start: 2024-01-17

## 2024-01-17 RX ADMIN — TESTOSTERONE CYPIONATE 0 MG/ML: 200 INJECTION INTRAMUSCULAR at 00:00

## 2024-01-23 ENCOUNTER — TRANSCRIPTION ENCOUNTER (OUTPATIENT)
Age: 49
End: 2024-01-23

## 2024-01-24 ENCOUNTER — APPOINTMENT (OUTPATIENT)
Dept: ENDOCRINOLOGY | Facility: CLINIC | Age: 49
End: 2024-01-24
Payer: MEDICAID

## 2024-01-24 PROCEDURE — 96372 THER/PROPH/DIAG INJ SC/IM: CPT

## 2024-01-24 RX ORDER — TESTOSTERONE CYPIONATE 200 MG/ML
200 INJECTION, SOLUTION INTRAMUSCULAR
Refills: 0 | Status: COMPLETED | OUTPATIENT
Start: 2024-01-24

## 2024-01-24 RX ADMIN — TESTOSTERONE CYPIONATE 0 MG/ML: 200 INJECTION INTRAMUSCULAR at 00:00

## 2024-01-31 ENCOUNTER — APPOINTMENT (OUTPATIENT)
Dept: ENDOCRINOLOGY | Facility: CLINIC | Age: 49
End: 2024-01-31
Payer: MEDICAID

## 2024-01-31 PROCEDURE — 96372 THER/PROPH/DIAG INJ SC/IM: CPT

## 2024-01-31 RX ORDER — TESTOSTERONE CYPIONATE 100 MG/ML
100 INJECTION, SOLUTION INTRAMUSCULAR
Refills: 0 | Status: COMPLETED | OUTPATIENT
Start: 2024-01-31

## 2024-02-07 ENCOUNTER — APPOINTMENT (OUTPATIENT)
Dept: ENDOCRINOLOGY | Facility: CLINIC | Age: 49
End: 2024-02-07
Payer: MEDICAID

## 2024-02-07 PROCEDURE — 96372 THER/PROPH/DIAG INJ SC/IM: CPT

## 2024-02-07 RX ORDER — TESTOSTERONE CYPIONATE 200 MG/ML
200 INJECTION, SOLUTION INTRAMUSCULAR
Refills: 0 | Status: COMPLETED | OUTPATIENT
Start: 2024-02-07

## 2024-02-07 RX ADMIN — TESTOSTERONE CYPIONATE 0 MG/ML: 200 INJECTION INTRAMUSCULAR at 00:00

## 2024-02-12 ENCOUNTER — TRANSCRIPTION ENCOUNTER (OUTPATIENT)
Age: 49
End: 2024-02-12

## 2024-02-12 RX ORDER — GLIMEPIRIDE 4 MG/1
4 TABLET ORAL
Qty: 180 | Refills: 1 | Status: ACTIVE | COMMUNITY
Start: 2021-01-07 | End: 1900-01-01

## 2024-02-12 RX ORDER — GEMFIBROZIL 600 MG/1
600 TABLET, FILM COATED ORAL DAILY
Qty: 90 | Refills: 1 | Status: ACTIVE | COMMUNITY
Start: 2020-10-15 | End: 1900-01-01

## 2024-02-12 RX ORDER — METFORMIN HYDROCHLORIDE 1000 MG/1
1000 TABLET, COATED ORAL
Qty: 180 | Refills: 0 | Status: ACTIVE | COMMUNITY
Start: 2021-06-15 | End: 1900-01-01

## 2024-02-14 ENCOUNTER — TRANSCRIPTION ENCOUNTER (OUTPATIENT)
Age: 49
End: 2024-02-14

## 2024-02-15 ENCOUNTER — TRANSCRIPTION ENCOUNTER (OUTPATIENT)
Age: 49
End: 2024-02-15

## 2024-02-21 ENCOUNTER — APPOINTMENT (OUTPATIENT)
Dept: ENDOCRINOLOGY | Facility: CLINIC | Age: 49
End: 2024-02-21
Payer: MEDICAID

## 2024-02-21 PROCEDURE — 96372 THER/PROPH/DIAG INJ SC/IM: CPT

## 2024-02-21 RX ORDER — TESTOSTERONE CYPIONATE 200 MG/ML
200 INJECTION, SOLUTION INTRAMUSCULAR
Refills: 0 | Status: COMPLETED | OUTPATIENT
Start: 2024-02-21

## 2024-02-21 RX ADMIN — TESTOSTERONE CYPIONATE 0 MG/ML: 200 INJECTION INTRAMUSCULAR at 00:00

## 2024-02-28 ENCOUNTER — APPOINTMENT (OUTPATIENT)
Dept: ENDOCRINOLOGY | Facility: CLINIC | Age: 49
End: 2024-02-28

## 2024-03-01 ENCOUNTER — TRANSCRIPTION ENCOUNTER (OUTPATIENT)
Age: 49
End: 2024-03-01

## 2024-03-06 ENCOUNTER — APPOINTMENT (OUTPATIENT)
Dept: ENDOCRINOLOGY | Facility: CLINIC | Age: 49
End: 2024-03-06
Payer: MEDICAID

## 2024-03-06 VITALS
WEIGHT: 228 LBS | OXYGEN SATURATION: 97 % | SYSTOLIC BLOOD PRESSURE: 130 MMHG | BODY MASS INDEX: 31.92 KG/M2 | HEIGHT: 71 IN | DIASTOLIC BLOOD PRESSURE: 75 MMHG | HEART RATE: 86 BPM

## 2024-03-06 DIAGNOSIS — N52.9 MALE ERECTILE DYSFUNCTION, UNSPECIFIED: ICD-10-CM

## 2024-03-06 DIAGNOSIS — R68.82 DECREASED LIBIDO: ICD-10-CM

## 2024-03-06 DIAGNOSIS — R30.0 DYSURIA: ICD-10-CM

## 2024-03-06 LAB
GLUCOSE BLDC GLUCOMTR-MCNC: 472
GLUCOSE BLDC GLUCOMTR-MCNC: 483
GLUCOSE BLDC GLUCOMTR-MCNC: 496

## 2024-03-06 PROCEDURE — 82962 GLUCOSE BLOOD TEST: CPT

## 2024-03-06 PROCEDURE — 99214 OFFICE O/P EST MOD 30 MIN: CPT | Mod: 25

## 2024-03-06 PROCEDURE — G2211 COMPLEX E/M VISIT ADD ON: CPT | Mod: NC,1L

## 2024-03-06 PROCEDURE — 96372 THER/PROPH/DIAG INJ SC/IM: CPT

## 2024-03-06 RX ORDER — GABAPENTIN 300 MG/1
300 CAPSULE ORAL 4 TIMES DAILY
Qty: 1 | Refills: 0 | Status: ACTIVE | COMMUNITY

## 2024-03-06 RX ORDER — ALFUZOSIN HYDROCHLORIDE 10 MG/1
10 TABLET, EXTENDED RELEASE ORAL DAILY
Refills: 0 | Status: ACTIVE | COMMUNITY

## 2024-03-06 RX ORDER — TESTOSTERONE CYPIONATE 200 MG/ML
200 INJECTION, SOLUTION INTRAMUSCULAR
Refills: 0 | Status: COMPLETED | OUTPATIENT
Start: 2024-03-06

## 2024-03-06 RX ADMIN — TESTOSTERONE CYPIONATE 0 MG/ML: 200 INJECTION, SOLUTION INTRAMUSCULAR at 00:00

## 2024-03-06 NOTE — PHYSICAL EXAM

## 2024-03-06 NOTE — HISTORY OF PRESENT ILLNESS
[FreeTextEntry1] : Interval history: Had a terrible virus last week and didnt take alot of his meds FS in office 496 (had large glass of OJ but states he woke up at 110)- 8 units of Humalog in left arm- Control No [id number], Exp April 13 2025, repeat  10 mins later, another 10 mins later 483  T2DM Severity: poor control Onset: routine  Duration: approx 4-5 years Modifying Factors: never has used insulin before  SMBG Checks FS 2-3x a day FS on average mid 100s, low 200s (is shocked by todays blood sugar reading)  Due for updated HGA1C  Current drug regimen- missed alot of doses when he was sick with the flu  MFN 1000 MG BID Jaridance 25 mg daily Back on alogliptin 25 mg daily (Nesina 25 mg ) since he stopped ozempic prior to thanksgving  Glimepiride 4 mg BID  Could not tolerate Victoza or Ozempic due to extreme N/V  Eye exam: karen MONTOYA 12/2022- due now Foot exam: Saw podiatry- +severe neuropathy, on gabapentin 1200 mg TID Kidney disease: denies  Heart disease: denies but FH, father with CAD  Weight: down 16 lbs since July but gained 3 lbs since stopping ozempic Diet:-eating a lot of pb and jelly  Drink- water  Exercise: tries to lift weights when he can, nothing consistent  Smoking: a pack a day smoker   HLD Gemfibrizol 600 mg daily Atorvastatin 20 mg daily  FH of high triglycerides Need updated lipid panel Strong FH of MI/heart disease  HTN BP in office 130/75 On no medications  Low Testosterone  Approx 9 years Has an 18 year old son On 50 mg of testosterone every week- due today Had brain MRI 2016 which was negative for pituitary adenoma  FH : Dad on androgel and felt very angry  Need updated testosterone levels   Low Vitamin D 50,000 IU supplement weekly Need updated levels with labs

## 2024-03-06 NOTE — REVIEW OF SYSTEMS
[Fatigue] : fatigue [Recent Weight Gain (___ Lbs)] : no recent weight gain [Recent Weight Loss (___ Lbs)] : no recent weight loss [Visual Field Defect] : no visual field defect [Neck Pain] : no neck pain [Dysphagia] : no dysphagia [Dysphonia] : no dysphonia [Chest Pain] : no chest pain [Constipation] : no constipation [Diarrhea] : no diarrhea [FreeTextEntry8] : but improved by alfuzosin [Hesistancy] : hesitancy [Polydipsia] : no polydipsia

## 2024-03-06 NOTE — ASSESSMENT
[FreeTextEntry1] : T2DM Pt requesting to go home- go for a walk- drink water and will portal message me with blood sugar reading. He is confident this is from the large glass of OJ he drank. I am concerned he needs insulin permanently. He will log sugars after one week and send logs to hep make decision (we have been close to needing insulin for years).    -Continue restarted alogliptin 25 mg daily -Continue Glimepriride to 4 mg BID -Continue Jardiance 25 mg daily -Continue MFN 1000 mg BID -Continue checking BS 2-3x a day. Send logs in every 1-2 weeks. --Must watch diet, no potato bread! -Increase exercise as tolerated, goal of 30 mins a day 5x a day -Continue to follow up with all specialists including ophtho, podiatry, neurology -Stop smoking!  HTN -Will continue to monitor, likely will need aceI for kidney protection   HLD -Continue Gemfibrozil and statin -Increase exercise efforts -Need cardiology referral  Low Testosterone -Continue 50 mg weekly of testosterone injection  Continue weekly vit d supplement  Fasting labs due ASAP- mid cycle RTO 3 months.

## 2024-03-07 ENCOUNTER — TRANSCRIPTION ENCOUNTER (OUTPATIENT)
Age: 49
End: 2024-03-07

## 2024-03-13 ENCOUNTER — APPOINTMENT (OUTPATIENT)
Dept: ENDOCRINOLOGY | Facility: CLINIC | Age: 49
End: 2024-03-13
Payer: MEDICAID

## 2024-03-13 PROCEDURE — 96372 THER/PROPH/DIAG INJ SC/IM: CPT

## 2024-03-13 RX ORDER — TESTOSTERONE CYPIONATE 200 MG/ML
200 INJECTION, SOLUTION INTRAMUSCULAR
Refills: 0 | Status: COMPLETED | OUTPATIENT
Start: 2024-03-13

## 2024-03-13 RX ADMIN — TESTOSTERONE CYPIONATE 0 MG/ML: 200 INJECTION, SOLUTION INTRAMUSCULAR at 00:00

## 2024-03-20 ENCOUNTER — APPOINTMENT (OUTPATIENT)
Dept: ENDOCRINOLOGY | Facility: CLINIC | Age: 49
End: 2024-03-20
Payer: MEDICAID

## 2024-03-20 PROCEDURE — 96372 THER/PROPH/DIAG INJ SC/IM: CPT

## 2024-03-20 RX ORDER — TESTOSTERONE CYPIONATE 200 MG/ML
200 INJECTION, SOLUTION INTRAMUSCULAR
Refills: 0 | Status: COMPLETED | OUTPATIENT
Start: 2024-03-20

## 2024-03-20 RX ADMIN — TESTOSTERONE CYPIONATE 0 MG/ML: 200 INJECTION, SOLUTION INTRAMUSCULAR at 00:00

## 2024-03-27 ENCOUNTER — APPOINTMENT (OUTPATIENT)
Dept: ENDOCRINOLOGY | Facility: CLINIC | Age: 49
End: 2024-03-27
Payer: MEDICAID

## 2024-03-27 PROCEDURE — 96372 THER/PROPH/DIAG INJ SC/IM: CPT

## 2024-03-27 RX ORDER — TESTOSTERONE CYPIONATE 200 MG/ML
200 INJECTION, SOLUTION INTRAMUSCULAR
Refills: 0 | Status: COMPLETED | OUTPATIENT
Start: 2024-03-27

## 2024-03-27 RX ADMIN — TESTOSTERONE CYPIONATE 0 MG/ML: 200 INJECTION, SOLUTION INTRAMUSCULAR at 00:00

## 2024-04-03 ENCOUNTER — APPOINTMENT (OUTPATIENT)
Dept: ENDOCRINOLOGY | Facility: CLINIC | Age: 49
End: 2024-04-03

## 2024-04-10 ENCOUNTER — APPOINTMENT (OUTPATIENT)
Dept: ENDOCRINOLOGY | Facility: CLINIC | Age: 49
End: 2024-04-10
Payer: MEDICAID

## 2024-04-10 PROCEDURE — 96372 THER/PROPH/DIAG INJ SC/IM: CPT

## 2024-04-10 RX ORDER — TESTOSTERONE CYPIONATE 200 MG/ML
200 INJECTION, SOLUTION INTRAMUSCULAR
Refills: 0 | Status: COMPLETED | OUTPATIENT
Start: 2024-04-10

## 2024-04-10 RX ADMIN — TESTOSTERONE CYPIONATE 0 MG/ML: 200 INJECTION, SOLUTION INTRAMUSCULAR at 00:00

## 2024-04-17 ENCOUNTER — APPOINTMENT (OUTPATIENT)
Dept: ENDOCRINOLOGY | Facility: CLINIC | Age: 49
End: 2024-04-17
Payer: MEDICAID

## 2024-04-17 PROCEDURE — 96372 THER/PROPH/DIAG INJ SC/IM: CPT

## 2024-04-17 RX ORDER — TESTOSTERONE CYPIONATE 200 MG/ML
200 INJECTION, SOLUTION INTRAMUSCULAR
Refills: 0 | Status: COMPLETED | OUTPATIENT
Start: 2024-04-17

## 2024-04-17 RX ADMIN — TESTOSTERONE CYPIONATE 0 MG/ML: 200 INJECTION, SOLUTION INTRAMUSCULAR at 00:00

## 2024-04-19 ENCOUNTER — RX RENEWAL (OUTPATIENT)
Age: 49
End: 2024-04-19

## 2024-04-19 RX ORDER — ATORVASTATIN CALCIUM 20 MG/1
20 TABLET, FILM COATED ORAL
Qty: 90 | Refills: 1 | Status: ACTIVE | COMMUNITY
Start: 2021-02-16 | End: 1900-01-01

## 2024-04-24 ENCOUNTER — APPOINTMENT (OUTPATIENT)
Dept: ENDOCRINOLOGY | Facility: CLINIC | Age: 49
End: 2024-04-24
Payer: MEDICAID

## 2024-04-24 PROCEDURE — 96372 THER/PROPH/DIAG INJ SC/IM: CPT

## 2024-04-24 RX ORDER — TESTOSTERONE CYPIONATE 200 MG/ML
200 INJECTION, SOLUTION INTRAMUSCULAR
Refills: 0 | Status: COMPLETED | OUTPATIENT
Start: 2024-04-24

## 2024-04-24 RX ADMIN — TESTOSTERONE CYPIONATE 0 MG/ML: 200 INJECTION, SOLUTION INTRAMUSCULAR at 00:00

## 2024-05-01 ENCOUNTER — APPOINTMENT (OUTPATIENT)
Dept: ENDOCRINOLOGY | Facility: CLINIC | Age: 49
End: 2024-05-01
Payer: MEDICAID

## 2024-05-01 PROCEDURE — 96372 THER/PROPH/DIAG INJ SC/IM: CPT

## 2024-05-01 RX ORDER — TESTOSTERONE CYPIONATE 200 MG/ML
200 INJECTION, SOLUTION INTRAMUSCULAR
Refills: 0 | Status: COMPLETED | OUTPATIENT
Start: 2024-05-01

## 2024-05-01 RX ADMIN — TESTOSTERONE CYPIONATE 0 MG/ML: 200 INJECTION, SOLUTION INTRAMUSCULAR at 00:00

## 2024-05-08 ENCOUNTER — APPOINTMENT (OUTPATIENT)
Dept: ENDOCRINOLOGY | Facility: CLINIC | Age: 49
End: 2024-05-08

## 2024-05-13 ENCOUNTER — TRANSCRIPTION ENCOUNTER (OUTPATIENT)
Age: 49
End: 2024-05-13

## 2024-05-13 ENCOUNTER — NON-APPOINTMENT (OUTPATIENT)
Age: 49
End: 2024-05-13

## 2024-05-15 ENCOUNTER — APPOINTMENT (OUTPATIENT)
Dept: ENDOCRINOLOGY | Facility: CLINIC | Age: 49
End: 2024-05-15
Payer: MEDICAID

## 2024-05-15 PROCEDURE — 96372 THER/PROPH/DIAG INJ SC/IM: CPT

## 2024-05-15 RX ORDER — ERGOCALCIFEROL 1.25 MG/1
1.25 MG CAPSULE ORAL
Qty: 12 | Refills: 0 | Status: ACTIVE | COMMUNITY
Start: 2020-10-26 | End: 1900-01-01

## 2024-05-15 RX ORDER — TESTOSTERONE CYPIONATE 200 MG/ML
200 INJECTION, SOLUTION INTRAMUSCULAR
Refills: 0 | Status: COMPLETED | OUTPATIENT
Start: 2024-05-15

## 2024-05-15 RX ORDER — BLOOD-GLUCOSE SENSOR
EACH MISCELLANEOUS
Qty: 6 | Refills: 1 | Status: ACTIVE | COMMUNITY
Start: 2024-05-15 | End: 1900-01-01

## 2024-05-15 RX ORDER — INSULIN GLARGINE 100 [IU]/ML
100 INJECTION, SOLUTION SUBCUTANEOUS
Qty: 1 | Refills: 1 | Status: ACTIVE | COMMUNITY
Start: 2024-05-15 | End: 1900-01-01

## 2024-05-15 RX ADMIN — TESTOSTERONE CYPIONATE 0 MG/ML: 200 INJECTION, SOLUTION INTRAMUSCULAR at 00:00

## 2024-05-22 ENCOUNTER — APPOINTMENT (OUTPATIENT)
Dept: ENDOCRINOLOGY | Facility: CLINIC | Age: 49
End: 2024-05-22

## 2024-05-29 ENCOUNTER — APPOINTMENT (OUTPATIENT)
Dept: ENDOCRINOLOGY | Facility: CLINIC | Age: 49
End: 2024-05-29
Payer: MEDICAID

## 2024-05-29 PROCEDURE — 96372 THER/PROPH/DIAG INJ SC/IM: CPT

## 2024-06-03 LAB
HBA1C MFR BLD HPLC: 12.4
LDLC SERPL DIRECT ASSAY-MCNC: 52
MICROALBUMIN/CREAT 24H UR-RTO: 7
TSH SERPL-ACNC: 2.02

## 2024-06-05 ENCOUNTER — APPOINTMENT (OUTPATIENT)
Dept: ENDOCRINOLOGY | Facility: CLINIC | Age: 49
End: 2024-06-05
Payer: MEDICAID

## 2024-06-05 VITALS
SYSTOLIC BLOOD PRESSURE: 135 MMHG | DIASTOLIC BLOOD PRESSURE: 80 MMHG | HEART RATE: 96 BPM | WEIGHT: 224 LBS | HEIGHT: 71 IN | OXYGEN SATURATION: 98 % | BODY MASS INDEX: 31.36 KG/M2

## 2024-06-05 DIAGNOSIS — E55.9 VITAMIN D DEFICIENCY, UNSPECIFIED: ICD-10-CM

## 2024-06-05 DIAGNOSIS — E78.5 HYPERLIPIDEMIA, UNSPECIFIED: ICD-10-CM

## 2024-06-05 DIAGNOSIS — E66.9 OBESITY, UNSPECIFIED: ICD-10-CM

## 2024-06-05 LAB — GLUCOSE BLDC GLUCOMTR-MCNC: 258

## 2024-06-05 PROCEDURE — 99214 OFFICE O/P EST MOD 30 MIN: CPT | Mod: 25

## 2024-06-05 PROCEDURE — 96372 THER/PROPH/DIAG INJ SC/IM: CPT

## 2024-06-05 PROCEDURE — 82962 GLUCOSE BLOOD TEST: CPT

## 2024-06-05 RX ORDER — TESTOSTERONE CYPIONATE 200 MG/ML
200 INJECTION, SOLUTION INTRAMUSCULAR
Refills: 0 | Status: COMPLETED | OUTPATIENT
Start: 2024-06-05

## 2024-06-05 RX ADMIN — TESTOSTERONE CYPIONATE 0 MG/ML: 200 INJECTION INTRAMUSCULAR at 00:00

## 2024-06-05 NOTE — DATA REVIEWED
[FreeTextEntry1] : Labs 5/3/2024 Glucose 328 TRig 516 LDL 52 HgbA1C 12.4 Testosterone 527 Vit D 23.8 Vit b12 680

## 2024-06-05 NOTE — HISTORY OF PRESENT ILLNESS
[FreeTextEntry1] : Interval history: Patient started basal insulin last week, but states he was never taught how to inject, he is not sure if he is doing it correctly. Also was started on Bibiana, but did not connect sensor to phone, so no data, Now in warm up mode. Testosterone injection to be given today  T2DM Severity: poor control Onset: routine Duration: approx 4-5 years Modifying Factors: never has used insulin before  SMBG Checks FS 2x's a day  AM Low 100s -180  HgbA1C 12.4  Current drug regimen- MFN 1000 MG BID Jaridance 25 mg daily alogliptin 25 mg daily  Glimepiride 4 mg BID Lantus 10 unitts QHS - he is not sure if he is injection correctly- started last thursday   Could not tolerate Victoza or Ozempic due to extreme N/V  Eye exam: karen ARANGO- LV 12/2023 Foot exam: Saw podiatry- +severe neuropathy, on gabapentin 1200 mg TID, every 3 months  Kidney disease: denies Heart disease: denies but FH, father with CAD  Weight:down 4 pounds  Diet:-eating a lot of pb and jelly Drink- water Exercise: tries to lift weights when he can, nothing consistent Smoking: a pack a day smoker  HLD Gemfibrizol 600 mg daily Atorvastatin 20 mg daily FH of high triglycerides   HTN BP in office 135/80 On no medications  Low Testosterone Approx 9 years Has an 18 year old son On 50 mg of testosterone every week- due today Had brain MRI 2016 which was negative for pituitary adenoma FH : Dad on androgel and felt very angry Need updated testosterone levels  Low Vitamin D 50,000 IU supplement weekly

## 2024-06-05 NOTE — ASSESSMENT
[Diabetes Foot Care] : diabetes foot care [Long Term Vascular Complications] : long term vascular complications of diabetes [Importance of Diet and Exercise] : importance of diet and exercise to improve glycemic control, achieve weight loss and improve cardiovascular health [Exercise/Effect on Glucose] : exercise/effect on glucose [Action and use of Insulin] : action and use of short and long-acting insulin [Retinopathy Screening] : Patient was referred to ophthalmology for retinopathy screening [FreeTextEntry1] : T2DM, now on insulin   -Continue alogliptin 25 mg daily -Continue Glimepriride to 4 mg BID -Continue Jardiance 25 mg daily -Continue MFN 1000 mg BID -Continue Lantus 10 units QHS -Continue checking BS 2-3x a day. Send logs in every 1-2 weeks. -Needs to use Bibiana correctly will download in 2 weeks  --Must watch diet, Limit carbs  -Increase exercise as tolerated, goal of 30 mins a day 5x a day -Continue to follow up with all specialists including ophtho, podiatry, neurology -Stop smoking! -RN will do insulin teach with patient  HTN -Will continue to monitor, likely will need aceI for kidney protection   HLD -Continue Gemfibrozil and statin -Increase exercise efforts  Low Testosterone -Continue 50 mg weekly of testosterone injection  Continue weekly vit d supplement   RTO 6 weeks NP RTO in 6 months MD

## 2024-06-12 ENCOUNTER — APPOINTMENT (OUTPATIENT)
Dept: ENDOCRINOLOGY | Facility: CLINIC | Age: 49
End: 2024-06-12
Payer: MEDICAID

## 2024-06-12 PROCEDURE — 96372 THER/PROPH/DIAG INJ SC/IM: CPT

## 2024-06-12 RX ORDER — TESTOSTERONE CYPIONATE 200 MG/ML
200 INJECTION, SOLUTION INTRAMUSCULAR
Refills: 0 | Status: COMPLETED | OUTPATIENT
Start: 2024-06-12

## 2024-06-12 RX ADMIN — TESTOSTERONE CYPIONATE 0 MG/ML: 200 INJECTION, SOLUTION INTRAMUSCULAR at 00:00

## 2024-06-19 ENCOUNTER — APPOINTMENT (OUTPATIENT)
Dept: ENDOCRINOLOGY | Facility: CLINIC | Age: 49
End: 2024-06-19

## 2024-06-21 ENCOUNTER — TRANSCRIPTION ENCOUNTER (OUTPATIENT)
Age: 49
End: 2024-06-21

## 2024-06-21 ENCOUNTER — NON-APPOINTMENT (OUTPATIENT)
Age: 49
End: 2024-06-21

## 2024-06-22 ENCOUNTER — RX RENEWAL (OUTPATIENT)
Age: 49
End: 2024-06-22

## 2024-06-22 RX ORDER — EMPAGLIFLOZIN 25 MG/1
25 TABLET, FILM COATED ORAL
Qty: 90 | Refills: 1 | Status: ACTIVE | COMMUNITY
Start: 2020-08-25 | End: 1900-01-01

## 2024-06-24 DIAGNOSIS — E11.9 TYPE 2 DIABETES MELLITUS W/OUT COMPLICATIONS: ICD-10-CM

## 2024-06-24 PROCEDURE — 95251 CONT GLUC MNTR ANALYSIS I&R: CPT

## 2024-06-26 ENCOUNTER — APPOINTMENT (OUTPATIENT)
Dept: ENDOCRINOLOGY | Facility: CLINIC | Age: 49
End: 2024-06-26
Payer: MEDICAID

## 2024-06-26 DIAGNOSIS — R79.89 OTHER SPECIFIED ABNORMAL FINDINGS OF BLOOD CHEMISTRY: ICD-10-CM

## 2024-06-26 PROCEDURE — 96372 THER/PROPH/DIAG INJ SC/IM: CPT

## 2024-06-26 RX ORDER — PEN NEEDLE, DIABETIC 32GX 5/32"
32G X 4 MM NEEDLE, DISPOSABLE MISCELLANEOUS
Qty: 4 | Refills: 1 | Status: ACTIVE | COMMUNITY
Start: 2024-05-15 | End: 1900-01-01

## 2024-06-26 RX ORDER — TESTOSTERONE CYPIONATE 200 MG/ML
200 INJECTION, SOLUTION INTRAMUSCULAR
Qty: 0.5 | Refills: 0 | Status: ACTIVE | COMMUNITY
Start: 2021-09-10

## 2024-06-26 RX ORDER — TESTOSTERONE CYPIONATE 200 MG/ML
200 INJECTION, SOLUTION INTRAMUSCULAR
Refills: 0 | Status: COMPLETED | OUTPATIENT
Start: 2024-06-26

## 2024-06-26 RX ORDER — INSULIN LISPRO 100 [IU]/ML
100 INJECTION, SOLUTION INTRAVENOUS; SUBCUTANEOUS
Qty: 1 | Refills: 1 | Status: ACTIVE | COMMUNITY
Start: 2024-06-26 | End: 1900-01-01

## 2024-06-26 RX ADMIN — TESTOSTERONE CYPIONATE 0 MG/ML: 200 INJECTION, SOLUTION INTRAMUSCULAR at 00:00

## 2024-07-10 ENCOUNTER — APPOINTMENT (OUTPATIENT)
Dept: ENDOCRINOLOGY | Facility: CLINIC | Age: 49
End: 2024-07-10
Payer: MEDICAID

## 2024-07-10 DIAGNOSIS — R79.89 OTHER SPECIFIED ABNORMAL FINDINGS OF BLOOD CHEMISTRY: ICD-10-CM

## 2024-07-10 PROCEDURE — 96372 THER/PROPH/DIAG INJ SC/IM: CPT

## 2024-07-10 RX ORDER — TESTOSTERONE CYPIONATE 200 MG/ML
200 INJECTION, SOLUTION INTRAMUSCULAR
Refills: 0 | Status: COMPLETED | OUTPATIENT
Start: 2024-07-10

## 2024-07-10 RX ADMIN — TESTOSTERONE CYPIONATE 0 MG/ML: 200 INJECTION INTRAMUSCULAR at 00:00

## 2024-07-16 ENCOUNTER — NON-APPOINTMENT (OUTPATIENT)
Age: 49
End: 2024-07-16

## 2024-07-17 ENCOUNTER — TRANSCRIPTION ENCOUNTER (OUTPATIENT)
Age: 49
End: 2024-07-17

## 2024-07-17 DIAGNOSIS — E11.9 TYPE 2 DIABETES MELLITUS W/OUT COMPLICATIONS: ICD-10-CM

## 2024-07-17 PROCEDURE — 95251 CONT GLUC MNTR ANALYSIS I&R: CPT

## 2024-07-24 ENCOUNTER — APPOINTMENT (OUTPATIENT)
Dept: ENDOCRINOLOGY | Facility: CLINIC | Age: 49
End: 2024-07-24
Payer: MEDICAID

## 2024-07-24 PROCEDURE — 96372 THER/PROPH/DIAG INJ SC/IM: CPT

## 2024-07-31 ENCOUNTER — TRANSCRIPTION ENCOUNTER (OUTPATIENT)
Age: 49
End: 2024-07-31

## 2024-08-07 ENCOUNTER — APPOINTMENT (OUTPATIENT)
Dept: ENDOCRINOLOGY | Facility: CLINIC | Age: 49
End: 2024-08-07

## 2024-08-07 PROCEDURE — 96372 THER/PROPH/DIAG INJ SC/IM: CPT

## 2024-08-07 PROCEDURE — 95251 CONT GLUC MNTR ANALYSIS I&R: CPT

## 2024-08-07 PROCEDURE — 82962 GLUCOSE BLOOD TEST: CPT

## 2024-08-07 PROCEDURE — 99215 OFFICE O/P EST HI 40 MIN: CPT | Mod: 25

## 2024-08-07 NOTE — HISTORY OF PRESENT ILLNESS
[FreeTextEntry1] : Interval history: Glucose has been elevated   T2DM Severity: poor control Onset: routine Duration: approx 4-5 years Modifying Factors: never has used insulin before  SMBG. CGM downloaded and reviewed: Bibiana  Average glucose: 260 % time CGM active: 100% Glucose variability (target <36%): 29.4 % VERY HIGH (>250): 52 % HIGH (181-250): 31 % TARGET (): 17 % LOW (54-69): 0 % VERY LOW (<54): 0 Interpretation: Overall glucose very elevated   Current FS: 419 gave water and Fiasp 20 units and water Blood Ketone 0.1 Repeat FS: 455 - have 15 units of Fiasp and more water Repeat   HgbA1C 12.4  Current drug regimen- MFN 1000 MG BID Jardiance 25 mg daily alogliptin 25 mg daily  Glimepiride 4 mg BID Lantus 15 units QHS  Humalog  10 nunits TID AC (misses some doses  Could not tolerate Victoza or Ozempic due to extreme N/V  Eye exam: karen ARANGO-  12/2023 Foot exam: Saw podiatry- +severe neuropathy, on gabapentin 1200 mg TID, every 3 months  Kidney disease: denies Heart disease: denies but FH, father with CAD  Weight: +6 pounds  Diet:-eating a lot of pb and jelly Drink- water Exercise: tries to lift weights when he can, nothing consistent Smoking: a pack a day smoker  HLD Gemfibrizol 600 mg daily Atorvastatin 20 mg daily FH of high triglycerides   HTN BP in office 130/80 On no medications  Low Testosterone Approx 9 years Has an 18 year old son On 50 mg of testosterone every week- due today Had brain MRI 2016 which was negative for pituitary adenoma FH : Dad on androgel and felt very angry Need updated testosterone levels  Low Vitamin D 50,000 IU supplement weekly

## 2024-08-07 NOTE — ASSESSMENT
[Diabetes Foot Care] : diabetes foot care [Long Term Vascular Complications] : long term vascular complications of diabetes [Carbohydrate Consistent Diet] : carbohydrate consistent diet [Importance of Diet and Exercise] : importance of diet and exercise to improve glycemic control, achieve weight loss and improve cardiovascular health [Exercise/Effect on Glucose] : exercise/effect on glucose [Retinopathy Screening] : Patient was referred to ophthalmology for retinopathy screening [FreeTextEntry1] : T2DM, now on insulin   -Continue alogliptin 25 mg daily -Continue Glimepriride to 4 mg BID -Continue Jardiance 25 mg daily -Continue MFN 1000 mg BID -Increase Lantus 25 units QHS -Increase Humalog to 14 units TID AC -Continue CGM use --Must watch diet, Limit carbs  -Increase exercise as tolerated, goal of 30 mins a day 5x a day -Continue to follow up with all specialists including ophtho, podiatry, neurology -Stop smoking!  HTN -Will continue to monitor, likely will need aceI for kidney protection   HLD -Continue Gemfibrozil and statin -Increase exercise efforts  Low Testosterone -Continue 50 mg weekly of testosterone injection- will have injection today   Continue weekly vit d supplement   RTO 6 weeks NP RTO in 5 months MD

## 2024-08-08 ENCOUNTER — RX RENEWAL (OUTPATIENT)
Age: 49
End: 2024-08-08

## 2024-08-21 ENCOUNTER — APPOINTMENT (OUTPATIENT)
Dept: ENDOCRINOLOGY | Facility: CLINIC | Age: 49
End: 2024-08-21

## 2024-08-23 ENCOUNTER — NON-APPOINTMENT (OUTPATIENT)
Age: 49
End: 2024-08-23

## 2024-08-23 DIAGNOSIS — E11.9 TYPE 2 DIABETES MELLITUS W/OUT COMPLICATIONS: ICD-10-CM

## 2024-08-23 PROCEDURE — 95251 CONT GLUC MNTR ANALYSIS I&R: CPT

## 2024-09-10 ENCOUNTER — TRANSCRIPTION ENCOUNTER (OUTPATIENT)
Age: 49
End: 2024-09-10

## 2024-09-11 ENCOUNTER — APPOINTMENT (OUTPATIENT)
Dept: ENDOCRINOLOGY | Facility: CLINIC | Age: 49
End: 2024-09-11
Payer: MEDICAID

## 2024-09-11 DIAGNOSIS — R79.89 OTHER SPECIFIED ABNORMAL FINDINGS OF BLOOD CHEMISTRY: ICD-10-CM

## 2024-09-11 PROCEDURE — 96372 THER/PROPH/DIAG INJ SC/IM: CPT

## 2024-09-11 RX ORDER — TESTOSTERONE CYPIONATE 200 MG/ML
200 INJECTION, SOLUTION INTRAMUSCULAR
Refills: 0 | Status: COMPLETED | OUTPATIENT
Start: 2024-09-11

## 2024-09-11 RX ADMIN — TESTOSTERONE CYPIONATE 0 MG/ML: 200 INJECTION INTRAMUSCULAR at 00:00

## 2024-09-25 ENCOUNTER — APPOINTMENT (OUTPATIENT)
Dept: ENDOCRINOLOGY | Facility: CLINIC | Age: 49
End: 2024-09-25
Payer: MEDICAID

## 2024-09-25 DIAGNOSIS — R79.89 OTHER SPECIFIED ABNORMAL FINDINGS OF BLOOD CHEMISTRY: ICD-10-CM

## 2024-09-25 PROCEDURE — 96372 THER/PROPH/DIAG INJ SC/IM: CPT

## 2024-09-25 RX ORDER — TESTOSTERONE CYPIONATE 200 MG/ML
200 INJECTION, SOLUTION INTRAMUSCULAR
Refills: 0 | Status: COMPLETED | OUTPATIENT
Start: 2024-09-25

## 2024-09-25 RX ADMIN — TESTOSTERONE CYPIONATE 0 MG/ML: 200 INJECTION INTRAMUSCULAR at 00:00

## 2024-10-09 ENCOUNTER — APPOINTMENT (OUTPATIENT)
Dept: ENDOCRINOLOGY | Facility: CLINIC | Age: 49
End: 2024-10-09
Payer: MEDICAID

## 2024-10-09 DIAGNOSIS — R79.89 OTHER SPECIFIED ABNORMAL FINDINGS OF BLOOD CHEMISTRY: ICD-10-CM

## 2024-10-09 PROCEDURE — 96372 THER/PROPH/DIAG INJ SC/IM: CPT

## 2024-10-09 RX ORDER — TESTOSTERONE CYPIONATE 200 MG/ML
200 INJECTION, SOLUTION INTRAMUSCULAR
Refills: 0 | Status: COMPLETED | OUTPATIENT
Start: 2024-10-09

## 2024-10-09 RX ADMIN — TESTOSTERONE CYPIONATE 0 MG/ML: 200 INJECTION INTRAMUSCULAR at 00:00

## 2024-10-23 ENCOUNTER — APPOINTMENT (OUTPATIENT)
Dept: ENDOCRINOLOGY | Facility: CLINIC | Age: 49
End: 2024-10-23
Payer: MEDICAID

## 2024-10-23 PROCEDURE — 96372 THER/PROPH/DIAG INJ SC/IM: CPT

## 2024-10-28 ENCOUNTER — RX RENEWAL (OUTPATIENT)
Age: 49
End: 2024-10-28

## 2024-11-06 ENCOUNTER — APPOINTMENT (OUTPATIENT)
Dept: ENDOCRINOLOGY | Facility: CLINIC | Age: 49
End: 2024-11-06

## 2024-11-13 ENCOUNTER — APPOINTMENT (OUTPATIENT)
Dept: ENDOCRINOLOGY | Facility: CLINIC | Age: 49
End: 2024-11-13

## 2024-11-20 ENCOUNTER — APPOINTMENT (OUTPATIENT)
Dept: ENDOCRINOLOGY | Facility: CLINIC | Age: 49
End: 2024-11-20
Payer: MEDICAID

## 2024-11-20 DIAGNOSIS — R79.89 OTHER SPECIFIED ABNORMAL FINDINGS OF BLOOD CHEMISTRY: ICD-10-CM

## 2024-11-20 PROCEDURE — 96372 THER/PROPH/DIAG INJ SC/IM: CPT

## 2024-11-20 RX ORDER — TESTOSTERONE CYPIONATE 200 MG/ML
200 INJECTION, SOLUTION INTRAMUSCULAR
Refills: 0 | Status: COMPLETED | OUTPATIENT
Start: 2024-11-20

## 2024-11-20 RX ADMIN — TESTOSTERONE CYPIONATE 0 MG/ML: 200 INJECTION INTRAMUSCULAR at 00:00

## 2024-11-27 ENCOUNTER — APPOINTMENT (OUTPATIENT)
Dept: OPHTHALMOLOGY | Facility: CLINIC | Age: 49
End: 2024-11-27

## 2024-12-04 ENCOUNTER — APPOINTMENT (OUTPATIENT)
Dept: ENDOCRINOLOGY | Facility: CLINIC | Age: 49
End: 2024-12-04
Payer: MEDICAID

## 2024-12-04 DIAGNOSIS — R79.89 OTHER SPECIFIED ABNORMAL FINDINGS OF BLOOD CHEMISTRY: ICD-10-CM

## 2024-12-04 PROCEDURE — 96372 THER/PROPH/DIAG INJ SC/IM: CPT

## 2024-12-04 RX ORDER — TESTOSTERONE CYPIONATE 200 MG/ML
200 INJECTION, SOLUTION INTRAMUSCULAR
Refills: 0 | Status: COMPLETED | OUTPATIENT
Start: 2024-12-04

## 2024-12-04 RX ADMIN — TESTOSTERONE CYPIONATE 0 MG/ML: 200 INJECTION INTRAMUSCULAR at 00:00

## 2024-12-18 ENCOUNTER — APPOINTMENT (OUTPATIENT)
Dept: ENDOCRINOLOGY | Facility: CLINIC | Age: 49
End: 2024-12-18
Payer: MEDICAID

## 2024-12-18 DIAGNOSIS — R79.89 OTHER SPECIFIED ABNORMAL FINDINGS OF BLOOD CHEMISTRY: ICD-10-CM

## 2024-12-18 PROCEDURE — 96372 THER/PROPH/DIAG INJ SC/IM: CPT

## 2024-12-18 RX ORDER — TESTOSTERONE CYPIONATE 200 MG/ML
200 INJECTION, SOLUTION INTRAMUSCULAR
Refills: 0 | Status: COMPLETED | OUTPATIENT
Start: 2024-12-18

## 2024-12-18 RX ADMIN — TESTOSTERONE CYPIONATE 0 MG/ML: 200 INJECTION INTRAMUSCULAR at 00:00

## 2024-12-27 ENCOUNTER — RX RENEWAL (OUTPATIENT)
Age: 49
End: 2024-12-27

## 2025-01-28 ENCOUNTER — APPOINTMENT (OUTPATIENT)
Dept: ENDOCRINOLOGY | Facility: CLINIC | Age: 50
End: 2025-01-28

## 2025-02-05 ENCOUNTER — TRANSCRIPTION ENCOUNTER (OUTPATIENT)
Age: 50
End: 2025-02-05

## 2025-02-18 ENCOUNTER — TRANSCRIPTION ENCOUNTER (OUTPATIENT)
Age: 50
End: 2025-02-18

## 2025-05-05 ENCOUNTER — APPOINTMENT (OUTPATIENT)
Dept: ENDOCRINOLOGY | Facility: CLINIC | Age: 50
End: 2025-05-05